# Patient Record
Sex: FEMALE | Race: WHITE | Employment: UNEMPLOYED | URBAN - METROPOLITAN AREA
[De-identification: names, ages, dates, MRNs, and addresses within clinical notes are randomized per-mention and may not be internally consistent; named-entity substitution may affect disease eponyms.]

---

## 2019-05-14 ENCOUNTER — TELEPHONE (OUTPATIENT)
Dept: SURGERY | Age: 34
End: 2019-05-14

## 2019-05-14 NOTE — TELEPHONE ENCOUNTER
Received a call from Dr. James Hdez office Faizan Harness wanting to refer the patient for RUQ pain and adenomas gallbladder. Spoke with the patient on the phone. Scheduled the patient on 5/17/19 at 1:00pm with Dr. Cordelia Haq. Bring ID, medication list, insurance card and co-pay. Gave the direction to the clinic.gave our office phone number. The patient verbalized understanding.   Electronically signed by Roro Sin on 5/14/19 at 11:16 AM

## 2019-05-17 ENCOUNTER — OFFICE VISIT (OUTPATIENT)
Dept: SURGERY | Age: 34
End: 2019-05-17
Payer: MEDICAID

## 2019-05-17 VITALS
SYSTOLIC BLOOD PRESSURE: 98 MMHG | RESPIRATION RATE: 18 BRPM | HEART RATE: 80 BPM | TEMPERATURE: 98.6 F | WEIGHT: 215 LBS | BODY MASS INDEX: 38.09 KG/M2 | DIASTOLIC BLOOD PRESSURE: 70 MMHG | HEIGHT: 63 IN

## 2019-05-17 DIAGNOSIS — K21.9 GASTROESOPHAGEAL REFLUX DISEASE WITHOUT ESOPHAGITIS: ICD-10-CM

## 2019-05-17 DIAGNOSIS — R10.13 EPIGASTRIC PAIN: Primary | ICD-10-CM

## 2019-05-17 PROCEDURE — 99203 OFFICE O/P NEW LOW 30 MIN: CPT | Performed by: SURGERY

## 2019-05-17 RX ORDER — RANITIDINE 150 MG/1
150 TABLET ORAL DAILY
Status: ON HOLD | COMMUNITY
Start: 2019-05-14 | End: 2019-05-21 | Stop reason: HOSPADM

## 2019-05-17 RX ORDER — DICYCLOMINE HYDROCHLORIDE 10 MG/1
10 CAPSULE ORAL PRN
Status: ON HOLD | COMMUNITY
Start: 2019-05-13 | End: 2022-06-07 | Stop reason: HOSPADM

## 2019-05-17 RX ORDER — OCTISALATE, AVOBENZONE, HOMOSALATE, AND OCTOCRYLENE 29.4; 29.4; 49; 25.48 MG/ML; MG/ML; MG/ML; MG/ML
LOTION TOPICAL PRN
Status: ON HOLD | COMMUNITY
Start: 2019-05-13 | End: 2022-06-07 | Stop reason: HOSPADM

## 2019-05-17 NOTE — PROGRESS NOTES
[facility] Surgery   History and Physical    Patient's Name/Date of Birth: Yanira Collins / 1985 (41 y.o.)    Date: May 17, 2019     PCP: Tamiko Goel DO    Chief Complaint:   Chief Complaint   Patient presents with    Abdominal Pain     RUQ pain    Diarrhea       History of Present Illness: Pt with 1 year epigastric RUQ pain with associated n/v occasionally diarrhea. Denies fevers chills. Pain is frequently burn aching pressure like with radiation to her back. Associated with PO intake. Smokes 7-8 cigarettes a day. Social drinker. No hx of similar sx. Father had CRC dx in late 45s. History reviewed. No pertinent past medical history. Past Surgical History:   Procedure Laterality Date    LEEP  2007       History reviewed. No pertinent family history.     Social History     Socioeconomic History    Marital status: Single     Spouse name: Not on file    Number of children: Not on file    Years of education: Not on file    Highest education level: Not on file   Occupational History    Not on file   Social Needs    Financial resource strain: Not on file    Food insecurity:     Worry: Not on file     Inability: Not on file    Transportation needs:     Medical: Not on file     Non-medical: Not on file   Tobacco Use    Smoking status: Current Every Day Smoker     Packs/day: 0.25    Smokeless tobacco: Never Used   Substance and Sexual Activity    Alcohol use: Yes     Comment: occ    Drug use: Not Currently    Sexual activity: Not on file   Lifestyle    Physical activity:     Days per week: Not on file     Minutes per session: Not on file    Stress: Not on file   Relationships    Social connections:     Talks on phone: Not on file     Gets together: Not on file     Attends Sikhism service: Not on file     Active member of club or organization: Not on file     Attends meetings of clubs or organizations: Not on file     Relationship status: Not on file    Intimate partner rule out PUD as the cause. - Will plan for EGD initially and potentially for cholecystectomy depending on the results and need for further work up     I recommended EGD with possible biopsy and I explained therisk, benefits, expected outcome, and alternatives to the procedure. Risks included but are not limited to bleeding, infection, and perforation. Min Haq understands and is in agreement.       Electronically Signed by Samuel Martines MD, FACS   1:21 PM

## 2019-05-17 NOTE — PROGRESS NOTES
products on the day of surgery    [x] If not already done, you can expect a call from registration    [x] You can expect a call the business day prior to procedure to notify you if your arrival time changes    [x] If you receive a survey after surgery we would greatly appreciate your comments    [] Parent/guardian of a minor must accompany their child and remain on the premises  the entire time they are under our care     [] Pediatric patients may bring favorite toy, blanket or comfort item with them    [] A caregiver or family member must remain with the patient during their stay if they are mentally handicapped, have dementia, disoriented or unable to use a call light or would be a safety concern if left unattended    [x] Please notify surgeon if you develop any illness between now and time of surgery (cold, cough, sore throat, fever, nausea, vomiting) or any signs of infections  including skin, wounds, and dental.    [x]  The [facility]patient [facility] is available to fill your prescription here on your day of surgery, ask your preop nurse for details    [x] Other instructions: Wear comfortable clothing  EDUCATIONAL MATERIALS PROVIDED:    [] PAT Preoperative Education Packet/Booklet     [] Medication List    [] Fluoroscopy Information Pamphlet    [] Transfusion bracelet applied with instructions    [] Joint replacement video reviewed    [] Shower with soap, lather and rinse well, and use CHG wipes provided the evening before surgery as instructed

## 2019-05-20 ENCOUNTER — TELEPHONE (OUTPATIENT)
Dept: SURGERY | Age: 34
End: 2019-05-20

## 2019-05-20 NOTE — TELEPHONE ENCOUNTER
Scheduled patient for EGD on 5/21/19 at 2:00pm in [facility]. Patient needs to report at the front entrance 1 hour prior to the procedure, no ASA products for 5 days. Patient verbalized understanding. Instruction letter handed on 5/17/19 at the office. Encouraged patient to call our office if any questions. Spoke with [facility] representative Fracisco Bates Per Yany, EGD does not require the prior authorization.    Electronically signed by Casimiro Villafuerte on 5/20/19 at 5:28 PM

## 2019-05-21 ENCOUNTER — ANESTHESIA EVENT (OUTPATIENT)
Dept: ENDOSCOPY | Age: 34
End: 2019-05-21
Payer: MEDICAID

## 2019-05-21 ENCOUNTER — HOSPITAL ENCOUNTER (OUTPATIENT)
Age: 34
Setting detail: OUTPATIENT SURGERY
Discharge: HOME | End: 2019-05-21
Attending: SURGERY | Admitting: SURGERY
Payer: MEDICAID

## 2019-05-21 ENCOUNTER — ANESTHESIA (OUTPATIENT)
Dept: ENDOSCOPY | Age: 34
End: 2019-05-21
Payer: MEDICAID

## 2019-05-21 VITALS
OXYGEN SATURATION: 97 % | SYSTOLIC BLOOD PRESSURE: 128 MMHG | RESPIRATION RATE: 12 BRPM | DIASTOLIC BLOOD PRESSURE: 88 MMHG

## 2019-05-21 VITALS
HEIGHT: 63 IN | WEIGHT: 214 LBS | HEART RATE: 76 BPM | BODY MASS INDEX: 37.92 KG/M2 | RESPIRATION RATE: 16 BRPM | SYSTOLIC BLOOD PRESSURE: 111 MMHG | OXYGEN SATURATION: 97 % | TEMPERATURE: 97.1 F | DIASTOLIC BLOOD PRESSURE: 60 MMHG

## 2019-05-21 DIAGNOSIS — K21.9 GASTROESOPHAGEAL REFLUX DISEASE WITHOUT ESOPHAGITIS: Primary | ICD-10-CM

## 2019-05-21 LAB — HCG(URINE) PREGNANCY TEST: NEGATIVE

## 2019-05-21 PROCEDURE — 43239 EGD BIOPSY SINGLE/MULTIPLE: CPT | Performed by: SURGERY

## 2019-05-21 PROCEDURE — 88342 IMHCHEM/IMCYTCHM 1ST ANTB: CPT

## 2019-05-21 PROCEDURE — 81025 URINE PREGNANCY TEST: CPT

## 2019-05-21 PROCEDURE — 6360000002 HC RX W HCPCS: Performed by: NURSE ANESTHETIST, CERTIFIED REGISTERED

## 2019-05-21 PROCEDURE — 2709999900 HC NON-CHARGEABLE SUPPLY: Performed by: SURGERY

## 2019-05-21 PROCEDURE — 3700000001 HC ADD 15 MINUTES (ANESTHESIA): Performed by: SURGERY

## 2019-05-21 PROCEDURE — 3609012400 HC EGD TRANSORAL BIOPSY SINGLE/MULTIPLE: Performed by: SURGERY

## 2019-05-21 PROCEDURE — 2580000003 HC RX 258: Performed by: NURSE ANESTHETIST, CERTIFIED REGISTERED

## 2019-05-21 PROCEDURE — 3700000000 HC ANESTHESIA ATTENDED CARE: Performed by: SURGERY

## 2019-05-21 PROCEDURE — 7100000011 HC PHASE II RECOVERY - ADDTL 15 MIN: Performed by: SURGERY

## 2019-05-21 PROCEDURE — 7100000010 HC PHASE II RECOVERY - FIRST 15 MIN: Performed by: SURGERY

## 2019-05-21 PROCEDURE — 88305 TISSUE EXAM BY PATHOLOGIST: CPT

## 2019-05-21 RX ORDER — SUCRALFATE ORAL 1 G/10ML
1 SUSPENSION ORAL 4 TIMES DAILY
Qty: 1200 ML | Refills: 3 | Status: ON HOLD | OUTPATIENT
Start: 2019-05-21 | End: 2022-06-07 | Stop reason: HOSPADM

## 2019-05-21 RX ORDER — SODIUM CHLORIDE 9 MG/ML
INJECTION, SOLUTION INTRAVENOUS CONTINUOUS PRN
Status: DISCONTINUED | OUTPATIENT
Start: 2019-05-21 | End: 2019-05-21 | Stop reason: SDUPTHER

## 2019-05-21 RX ORDER — PROPOFOL 10 MG/ML
INJECTION, EMULSION INTRAVENOUS PRN
Status: DISCONTINUED | OUTPATIENT
Start: 2019-05-21 | End: 2019-05-21 | Stop reason: SDUPTHER

## 2019-05-21 RX ORDER — PANTOPRAZOLE SODIUM 40 MG/1
40 TABLET, DELAYED RELEASE ORAL DAILY
Qty: 60 TABLET | Refills: 1 | Status: SHIPPED | OUTPATIENT
Start: 2019-05-21 | End: 2019-07-31 | Stop reason: ALTCHOICE

## 2019-05-21 RX ADMIN — PROPOFOL 300 MG: 10 INJECTION, EMULSION INTRAVENOUS at 13:48

## 2019-05-21 RX ADMIN — SODIUM CHLORIDE: 9 INJECTION, SOLUTION INTRAVENOUS at 13:48

## 2019-05-21 ASSESSMENT — LIFESTYLE VARIABLES: SMOKING_STATUS: 1

## 2019-05-21 ASSESSMENT — PAIN SCALES - GENERAL
PAINLEVEL_OUTOF10: 0

## 2019-05-21 ASSESSMENT — PAIN - FUNCTIONAL ASSESSMENT: PAIN_FUNCTIONAL_ASSESSMENT: 0-10

## 2019-05-21 NOTE — OP NOTE
EGD Op Note    DATE OF PROCEDURE: 5/21/2019     SURGEON: Kasey Bowman MD    PREOPERATIVE DIAGNOSIS: ABDOMINAL PAIN     POSTOPERATIVE DIAGNOSIS: Same Hiatal hernia, esophagitis, moderate active gastritis     OPERATION: Procedure(s):  EGD BIOPSY    ANESTHESIA: Local monitored anesthesia. ESTIMATED BLOOD LOSS: minimal    COMPLICATIONS: None. SPECIMENS:    ID Type Source Tests Collected by Time Destination   A : Antral bx Tissue Stomach SURGICAL PATHOLOGY Kasey Bowman MD 5/21/2019 1353        HISTORY: The patient is a 29y.o. year old female with history of above preop diagnosis. I recommended esophagogastroduodenoscopy with possible biopsy and I explained the risk, benefits, expected outcome, and alternatives to the procedure. Risks included but are not limited to bleeding, infection, respiratory distress, hypotension, and perforation of the esophagus, stomach, or duodenum. Patient understands and is in agreement. PROCEDURE: The patient was given IV conscious sedation per anesthesia. The patient was given supplemental oxygen by nasal cannula. The gastroscope was inserted orally and advanced under direct vision through the esophagus, through the stomach, through the pylorus, and into the duodenum. Findings:  Duodenum:     Descending: normal    Bulb: moderate duodenitis     Stomach:    Antrum: moderate gastritis bx taken x 2     Body: same     Fundus: same, hiatal hernia, prominent rugal folds up to level of GE junction,     Esophagus: abnormal, mild esophagitis with two linear ulcers   GE junction: 30 cm from incisors     Larynx: not examined    The scope was removed and the patient tolerated the procedure well. IMPRESSION/PLAN:   1. PPI and carafate,   2. Check for H pylori in bx,   3. Will get esophogram to evaluate HH  4.  Follow up in 2 weeks     Kasey Bowman MD  05/21/19  1:58 PM

## 2019-05-21 NOTE — ANESTHESIA PRE PROCEDURE
Department of Anesthesiology  Preprocedure Note       Name:  Carmen Pineda   Age:  29 y.o.  :  1985                                          MRN:  [id number]         Date:  2019      Surgeon: Pilar Bell):  Kieran Dove MD    Procedure: EGD ESOPHAGOGASTRODUODENOSCOPY (N/A )    Medications prior to admission:   Prior to Admission medications    Medication Sig Start Date End Date Taking? Authorizing Provider   Probiotic Product (ALIGN) 4 MG CAPS Last dose 19  Yes Historical Provider, MD   ranitidine (ZANTAC) 150 MG tablet Take 150 mg by mouth daily Instructed to take morning of surgery with a sip of water 19  Yes Historical Provider, MD   dicyclomine (BENTYL) 10 MG capsule Take 10 mg by mouth  19  Yes Historical Provider, MD       Current medications:    No current facility-administered medications for this encounter.         Allergies:  No Known Allergies    Problem List:    Patient Active Problem List   Diagnosis Code    Epigastric pain R10.13    Gastroesophageal reflux disease without esophagitis K21.9       Past Medical History:        Diagnosis Date    Diarrhea     RUQ discomfort        Past Surgical History:        Procedure Laterality Date    LEEP         Social History:    Social History     Tobacco Use    Smoking status: Current Every Day Smoker     Packs/day: 0.25    Smokeless tobacco: Never Used   Substance Use Topics    Alcohol use: Yes     Comment: occasional                                Ready to quit: Not Answered  Counseling given: Not Answered      Vital Signs (Current):   Vitals:    19 1543 19 1325   BP:  105/68   Pulse:  71   Resp:  18   Temp:  96.6 °F (35.9 °C)   TempSrc:  Temporal   SpO2:  98%   Weight: 214 lb (97.1 kg) 214 lb (97.1 kg)   Height: 5' 3\" (1.6 m) 5' 3\" (1.6 m)                                              BP Readings from Last 3 Encounters:   19 105/68   19 98/70       NPO Status: Time of last liquid consumption: 2300                        Time of last solid consumption: 2300                        Date of last liquid consumption: 05/20/19                        Date of last solid food consumption: 05/20/19    BMI:   Wt Readings from Last 3 Encounters:   05/21/19 214 lb (97.1 kg)   05/17/19 215 lb (97.5 kg)     Body mass index is 37.91 kg/m². CBC: No results found for: WBC, RBC, HGB, HCT, MCV, RDW, PLT    CMP: No results found for: NA, K, CL, CO2, BUN, CREATININE, GFRAA, AGRATIO, LABGLOM, GLUCOSE, PROT, CALCIUM, BILITOT, ALKPHOS, AST, ALT    POC Tests: No results for input(s): POCGLU, POCNA, POCK, POCCL, POCBUN, POCHEMO, POCHCT in the last 72 hours. Coags: No results found for: PROTIME, INR, APTT    HCG (If Applicable):   Lab Results   Component Value Date    PREGTESTUR NEGATIVE 05/21/2019        ABGs: No results found for: PHART, PO2ART, PUK3LGT, PQU5AHC, BEART, G0EOIQYX     Type & Screen (If Applicable):  No results found for: LABABO, [address]    Anesthesia Evaluation  Patient summary reviewed no history of anesthetic complications:   Airway: Mallampati: II  TM distance: >3 FB   Neck ROM: full  Mouth opening: > = 3 FB Dental: normal exam         Pulmonary: breath sounds clear to auscultation  (+) current smoker                           Cardiovascular:Negative CV ROS            Rhythm: regular             Beta Blocker:  Not on Beta Blocker         Neuro/Psych:   Negative Neuro/Psych ROS              GI/Hepatic/Renal:   (+) GERD:,          ROS comment: RUQ DISCOMFORT  EPIGASTRIC PAIN. Endo/Other: Negative Endo/Other ROS                    Abdominal:           Vascular: negative vascular ROS. Anesthesia Plan      MAC     ASA 2       Induction: intravenous. Anesthetic plan and risks discussed with patient. Plan discussed with CRNA.             Ken Walls DO   5/21/2019

## 2019-05-21 NOTE — H&P
[facility] Surgery   History and Physical    Patient's Name/Date of Birth: Sandra Morales / 1985 (89 y.o.)    Date: May 21, 2019     PCP: Mai Cunha DO    Chief Complaint:   No chief complaint on file. History of Present Illness: Pt with 1 year epigastric RUQ pain with associated n/v occasionally diarrhea. Denies fevers chills. Pain is frequently burn aching pressure like with radiation to her back. Associated with PO intake. Smokes 7-8 cigarettes a day. Social drinker. No hx of similar sx. Father had CRC dx in late 45s. Past Medical History:   Diagnosis Date    Diarrhea     RUQ discomfort        Past Surgical History:   Procedure Laterality Date    LEEP  2007       History reviewed. No pertinent family history.     Social History     Socioeconomic History    Marital status: Single     Spouse name: Not on file    Number of children: Not on file    Years of education: Not on file    Highest education level: Not on file   Occupational History    Not on file   Social Needs    Financial resource strain: Not on file    Food insecurity:     Worry: Not on file     Inability: Not on file    Transportation needs:     Medical: Not on file     Non-medical: Not on file   Tobacco Use    Smoking status: Current Every Day Smoker     Packs/day: 0.25    Smokeless tobacco: Never Used   Substance and Sexual Activity    Alcohol use: Yes     Comment: occasional    Drug use: Not Currently    Sexual activity: Not on file   Lifestyle    Physical activity:     Days per week: Not on file     Minutes per session: Not on file    Stress: Not on file   Relationships    Social connections:     Talks on phone: Not on file     Gets together: Not on file     Attends Jehovah's witness service: Not on file     Active member of club or organization: Not on file     Attends meetings of clubs or organizations: Not on file     Relationship status: Not on file    Intimate partner violence:     Fear of current or ex partner: Not on file     Emotionally abused: Not on file     Physically abused: Not on file     Forced sexual activity: Not on file   Other Topics Concern    Not on file   Social History Narrative    Not on file       No current facility-administered medications for this encounter. No Known Allergies    REVIEW OF SYSTEMS:    Constitutional: negative   Eyes: negative  Ears, nose, mouth, throat, and face: negative  Respiratory: negative  Cardiovascular: negative  Gastrointestinal: abd pain, upper, occasional bleeding per rectum small amounts bright red. Genitourinary:negative  Integument/breast: negative  Hematologic/lymphatic: negative  Musculoskeletal:negative  Neurological: negative  Allergic/Immunologic: negative      Physical Exam:    @/68   Pulse 71   Temp 96.6 °F (35.9 °C) (Temporal)   Resp 18   Ht 5' 3\" (1.6 m)   Wt 214 lb (97.1 kg)   LMP 05/05/2019 Comment: urine preg sent  SpO2 98%   BMI 37.91 kg/m² @    GENERAL EXAM: On exam- pt appears stated age. No acute distress. NEURO:  Alert and oriented x 3. No obvious neuro deficits   HEENT: head- atraumatic-normocephalic. No discharge from ears, nose or throat. NECK: Supple. No jugular venous distention. CVS:Heart rate and rhythm regular. LUNGS: Bilateral chest movements without the use of accessory muscles. Respirations easy, nonlabored, breath sounds clear   ABDOMEN: Abdomen soft, nondistended, nontender, No palpable hernias noted. Mild to moderate RUQ tenderness   RECTAL: exam deferred. EXTREMITIES: No edema, NVI and symmetrical        US from OSH shows adenomyosis of the gallbladder. The record did not comment on size of polyps within the gallbladder. IMPRESSION/PLAN: RUQ pain with biliary type sx and gallbladder polyps sizes unknown. Cannot rule out PUD as the cause.         - Will plan for EGD initially and potentially for cholecystectomy depending on the results and need for further work up     I recommended EGD with possible biopsy and I explained therisk, benefits, expected outcome, and alternatives to the procedure. Risks included but are not limited to bleeding, infection, and perforation. Jacinda Hernandes understands and is in agreement. No changes, since seen in office.      Electronically Signed by Gabe Alexandre MD FACS   1:39 PM

## 2019-05-21 NOTE — ANESTHESIA POSTPROCEDURE EVALUATION
Department of Anesthesiology  Postprocedure Note    Patient: Yanira Collins  MRN: [id number]  YOB: 1985  Date of evaluation: 5/21/2019  Time:  2:26 PM     Procedure Summary     Date:  05/21/19 Room / Location:  [facility] 03 / [facility] ENDOSCOPY    Anesthesia Start:  0993 Anesthesia Stop:  1400    Procedure:  EGD BIOPSY (N/A ) Diagnosis:  (GERD)    Surgeon:  Pramod Rodriguez MD Responsible Provider:  Carolann Davies DO    Anesthesia Type:  MAC ASA Status:  2          Anesthesia Type: MAC    Constance Phase I: Constance Score: 10    Constance Phase II: Constance Score: 10    Last vitals: Reviewed and per EMR flowsheets.        Anesthesia Post Evaluation    Patient location during evaluation: PACU  Patient participation: complete - patient participated  Level of consciousness: awake and alert  Airway patency: patent  Nausea & Vomiting: no nausea and no vomiting  Complications: no  Cardiovascular status: hemodynamically stable  Respiratory status: acceptable  Hydration status: euvolemic

## 2019-05-30 DIAGNOSIS — K21.9 GASTROESOPHAGEAL REFLUX DISEASE WITHOUT ESOPHAGITIS: Primary | ICD-10-CM

## 2019-06-12 ENCOUNTER — TELEPHONE (OUTPATIENT)
Dept: SURGERY | Age: 34
End: 2019-06-12

## 2019-06-12 ENCOUNTER — INITIAL CONSULT (OUTPATIENT)
Dept: SURGERY | Age: 34
End: 2019-06-12
Payer: MEDICAID

## 2019-06-12 VITALS
HEART RATE: 80 BPM | DIASTOLIC BLOOD PRESSURE: 84 MMHG | HEIGHT: 63 IN | WEIGHT: 200 LBS | BODY MASS INDEX: 35.44 KG/M2 | SYSTOLIC BLOOD PRESSURE: 136 MMHG

## 2019-06-12 DIAGNOSIS — T81.32XD DISRUPTION OR DEHISCENCE OF CLOSURE OF MUSCLE OR MUSCLE FLAP, SUBSEQUENT ENCOUNTER: ICD-10-CM

## 2019-06-12 DIAGNOSIS — K44.9 PARAESOPHAGEAL HERNIA: ICD-10-CM

## 2019-06-12 DIAGNOSIS — K21.9 GASTROESOPHAGEAL REFLUX DISEASE WITHOUT ESOPHAGITIS: Primary | ICD-10-CM

## 2019-06-12 DIAGNOSIS — R10.13 EPIGASTRIC PAIN: ICD-10-CM

## 2019-06-12 PROCEDURE — 4004F PT TOBACCO SCREEN RCVD TLK: CPT | Performed by: SURGERY

## 2019-06-12 PROCEDURE — G8417 CALC BMI ABV UP PARAM F/U: HCPCS | Performed by: SURGERY

## 2019-06-12 PROCEDURE — G8427 DOCREV CUR MEDS BY ELIG CLIN: HCPCS | Performed by: SURGERY

## 2019-06-12 PROCEDURE — 99215 OFFICE O/P EST HI 40 MIN: CPT | Performed by: SURGERY

## 2019-06-12 NOTE — TELEPHONE ENCOUNTER
Per Dr. Benjamin Vaz, patient is scheduled for EGD with pH/Manometry on 6/17/19 at [facility]. Patient is also scheduled for laparoscopic paraesophageal hernia repair on 7/16/19. Surgery scheduling forms faxed with confirmation. Manometry order/scheduling request sent to endoscopy/Shira with confirmation. Surgeons calendar updated. Dr. Benjamin Vaz to enter orders. Follow up appointment scheduled for 7/31/19 at 1000. Per [facility] community rep, no prior authorization is required for scheduled outpatient procedures cpt's [phone] & [id number]. Call ref# [id number].     Electronically signed by Homer Wharton RN on 6/12/2019 at 2:06 PM

## 2019-06-12 NOTE — PROGRESS NOTES
on file     Non-medical: Not on file   Tobacco Use    Smoking status: Current Every Day Smoker     Packs/day: 0.25    Smokeless tobacco: Never Used   Substance and Sexual Activity    Alcohol use: Yes     Comment: occasional    Drug use: Not Currently    Sexual activity: Not on file   Lifestyle    Physical activity:     Days per week: Not on file     Minutes per session: Not on file    Stress: Not on file   Relationships    Social connections:     Talks on phone: Not on file     Gets together: Not on file     Attends Shinto service: Not on file     Active member of club or organization: Not on file     Attends meetings of clubs or organizations: Not on file     Relationship status: Not on file    Intimate partner violence:     Fear of current or ex partner: Not on file     Emotionally abused: Not on file     Physically abused: Not on file     Forced sexual activity: Not on file   Other Topics Concern    Not on file   Social History Narrative    Not on file           Review of Systems  Review of Systems -  General ROS: negative for - chills, fatigue or malaise  ENT ROS: negative for - hearing change, nasal congestion or nasal discharge  Allergy and Immunology ROS: negative for - hives, itchy/watery eyes or nasal congestion  Hematological and Lymphatic ROS: negative for - blood clots, blood transfusions, bruising or fatigue  Endocrine ROS: negative for - malaise/lethargy, mood swings, palpitations or polydipsia/polyuria  Respiratory ROS: negative for - sputum changes, stridor, tachypnea or wheezing  Cardiovascular ROS: negative for - irregular heartbeat, loss of consciousness, murmur or orthopnea  Gastrointestinal ROS: negative for - constipation, diarrhea, gas/bloating, heartburn or hematemesis  Genito-Urinary ROS: negative for -  genital discharge, genital ulcers or hematuria  Musculoskeletal ROS: negative for - gait disturbance, muscle pain or muscular weakness  Psych/Soc ROS: Neg suicidal ideation or visual/auditory hallucinations    Physical exam:   /84 (Site: Left Upper Arm, Position: Sitting, Cuff Size: Large Adult)   Pulse 80   Ht 5' 3\" (1.6 m)   Wt 200 lb (90.7 kg)   LMP 05/05/2019 Comment: urine preg sent  BMI 35.43 kg/m²   General appearance:  NAD  Head: NCAT, PERRLA, EOMI, red conjunctiva  Neck: supple, no masses  Lungs: CTAB, equal chest rise bilateral  Heart: Reg rate  Abdomen: soft, nontender, nondistended  Rectal: No bleeding  Skin; no lesions  Gu: no cva tenderness  Extremities: no edema  Psychosocial: No auditory or visual hallucinations      Radiology:    Manometry: Pending  PH testing: Pending  Last EGD pathology was neg for h pylori    Assessment:  Jeff Bhatt is a 29 y.o. female with symptomatic paraesophageal hernia  Patient Active Problem List   Diagnosis    Epigastric pain    Gastroesophageal reflux disease without esophagitis         Plan:  EGD with pH and manometry  Pending findings would proceed to OR with lap poss robot assisted paraesophageal hernia repair with fundoplication, myofascial flap and poss gastropexy  Discussed the risk, benefits and alternatives of surgery including wound infections, bleeding, scar, recurrent hernia formation, dysphagia, inability to belch or vomit, stricture and need for dilation and the risks of general anesthetic including MI, CVA, sudden death or reactions to anesthetic medications. The patient understands the risks and alternatives and the possibility of converting to an open procedure. All questions were answered to the patient's satisfaction and they freely signed the consent.              Viktor Smart MD  10:29 AM  6/12/2019

## 2019-06-14 RX ORDER — PROMETHAZINE HYDROCHLORIDE 12.5 MG/1
12.5 TABLET ORAL EVERY 6 HOURS PRN
COMMUNITY
End: 2019-07-31

## 2019-06-14 RX ORDER — AMITRIPTYLINE HYDROCHLORIDE 10 MG/1
10 TABLET, FILM COATED ORAL NIGHTLY
Status: ON HOLD | COMMUNITY
End: 2022-06-07 | Stop reason: HOSPADM

## 2019-06-16 ENCOUNTER — ANESTHESIA EVENT (OUTPATIENT)
Dept: ENDOSCOPY | Age: 34
End: 2019-06-16

## 2019-06-17 ENCOUNTER — ANESTHESIA (OUTPATIENT)
Dept: ENDOSCOPY | Age: 34
End: 2019-06-17

## 2019-06-17 ENCOUNTER — HOSPITAL ENCOUNTER (OUTPATIENT)
Age: 34
Setting detail: OUTPATIENT SURGERY
Discharge: HOME | End: 2019-06-17
Attending: INTERNAL MEDICINE | Admitting: INTERNAL MEDICINE
Payer: MEDICAID

## 2019-06-17 VITALS
RESPIRATION RATE: 14 BRPM | BODY MASS INDEX: 37.03 KG/M2 | WEIGHT: 209 LBS | DIASTOLIC BLOOD PRESSURE: 81 MMHG | OXYGEN SATURATION: 96 % | TEMPERATURE: 97.2 F | HEIGHT: 63 IN | SYSTOLIC BLOOD PRESSURE: 141 MMHG | HEART RATE: 69 BPM

## 2019-06-17 LAB
AMPHETAMINE SCREEN, URINE: NOT DETECTED
BARBITURATE SCREEN URINE: NOT DETECTED
BENZODIAZEPINE SCREEN, URINE: NOT DETECTED
CANNABINOID SCREEN URINE: POSITIVE
COCAINE METABOLITE SCREEN URINE: POSITIVE
HCG(URINE) PREGNANCY TEST: NEGATIVE
METHADONE SCREEN, URINE: NOT DETECTED
OPIATE SCREEN URINE: NOT DETECTED
PHENCYCLIDINE SCREEN URINE: NOT DETECTED
PROPOXYPHENE SCREEN: NOT DETECTED

## 2019-06-17 PROCEDURE — 6370000000 HC RX 637 (ALT 250 FOR IP): Performed by: INTERNAL MEDICINE

## 2019-06-17 PROCEDURE — 80307 DRUG TEST PRSMV CHEM ANLYZR: CPT

## 2019-06-17 PROCEDURE — 3609015500 HC GASTRIC/DUODENAL MOTILITY &/OR MANOMETRY STUDY: Performed by: INTERNAL MEDICINE

## 2019-06-17 PROCEDURE — 81025 URINE PREGNANCY TEST: CPT

## 2019-06-17 PROCEDURE — 2580000003 HC RX 258: Performed by: ANESTHESIOLOGY

## 2019-06-17 RX ORDER — SODIUM CHLORIDE 0.9 % (FLUSH) 0.9 %
10 SYRINGE (ML) INJECTION PRN
Status: DISCONTINUED | OUTPATIENT
Start: 2019-06-17 | End: 2019-06-19 | Stop reason: HOSPADM

## 2019-06-17 RX ORDER — SODIUM CHLORIDE 0.9 % (FLUSH) 0.9 %
10 SYRINGE (ML) INJECTION EVERY 12 HOURS SCHEDULED
Status: DISCONTINUED | OUTPATIENT
Start: 2019-06-17 | End: 2019-06-19 | Stop reason: HOSPADM

## 2019-06-17 RX ORDER — SODIUM CHLORIDE, SODIUM LACTATE, POTASSIUM CHLORIDE, CALCIUM CHLORIDE 600; 310; 30; 20 MG/100ML; MG/100ML; MG/100ML; MG/100ML
INJECTION, SOLUTION INTRAVENOUS CONTINUOUS
Status: DISCONTINUED | OUTPATIENT
Start: 2019-06-17 | End: 2019-06-19 | Stop reason: HOSPADM

## 2019-06-17 RX ADMIN — SODIUM CHLORIDE, POTASSIUM CHLORIDE, SODIUM LACTATE AND CALCIUM CHLORIDE: 600; 310; 30; 20 INJECTION, SOLUTION INTRAVENOUS at 12:11

## 2019-06-17 ASSESSMENT — LIFESTYLE VARIABLES: SMOKING_STATUS: 1

## 2019-06-17 NOTE — ANESTHESIA PRE PROCEDURE
Department of Anesthesiology  Preprocedure Note       Name:  Rachel Lyn   Age:  29 y.o.  :  1985                                          MRN:  [id number]         Date:  2019      Surgeon:  Dr. Izaiah Slaughter MD.    Procedure: ESOPHAGEAL MOTILITY/MANOMETRY STUDY (N/A )  EGD ESOPHAGOGASTRODUODENOSCOPY WITH BRAVO (N/A )    Medications prior to admission:   Prior to Admission medications    Medication Sig Start Date End Date Taking? Authorizing Provider   amitriptyline (ELAVIL) 10 MG tablet Take 10 mg by mouth nightly    Historical Provider, MD   promethazine (PHENERGAN) 12.5 MG tablet Take 12.5 mg by mouth every 6 hours as needed for Nausea    Historical Provider, MD   pantoprazole (PROTONIX) 40 MG tablet Take 1 tablet by mouth daily 19   Seven Barreto MD   sucralfate (CARAFATE) 1 GM/10ML suspension Take 10 mLs by mouth 4 times daily 19   Seven Barreto MD   Probiotic Product (ALIGN) 4 MG CAPS Last dose 19   Historical Provider, MD   dicyclomine (BENTYL) 10 MG capsule Take 10 mg by mouth  19   Historical Provider, MD       Current medications:    Current Outpatient Medications   Medication Sig Dispense Refill    amitriptyline (ELAVIL) 10 MG tablet Take 10 mg by mouth nightly      promethazine (PHENERGAN) 12.5 MG tablet Take 12.5 mg by mouth every 6 hours as needed for Nausea      pantoprazole (PROTONIX) 40 MG tablet Take 1 tablet by mouth daily 60 tablet 1    sucralfate (CARAFATE) 1 GM/10ML suspension Take 10 mLs by mouth 4 times daily 1200 mL 3    Probiotic Product (ALIGN) 4 MG CAPS Last dose 19      dicyclomine (BENTYL) 10 MG capsule Take 10 mg by mouth        No current facility-administered medications for this visit.         Allergies:  No Known Allergies    Problem List:    Patient Active Problem List   Diagnosis Code    Epigastric pain R10.13    Gastroesophageal reflux disease without esophagitis K21.9       Past Medical History:        Diagnosis Date  Diarrhea     RUQ discomfort        Past Surgical History:        Procedure Laterality Date    LEEP  2007    UPPER GASTROINTESTINAL ENDOSCOPY N/A 5/21/2019    EGD BIOPSY performed by Steven Mckeon MD at [facility] ENDOSCOPY       Social History:    Social History     Tobacco Use    Smoking status: Current Every Day Smoker     Packs/day: 0.25    Smokeless tobacco: Never Used   Substance Use Topics    Alcohol use: Yes     Comment: occasional                                Ready to quit: Not Answered  Counseling given: Not Answered      Vital Signs (Current): There were no vitals filed for this visit. BP Readings from Last 3 Encounters:   06/12/19 136/84   05/21/19 111/60   05/21/19 128/88       NPO Status:                                                                                 BMI:   Wt Readings from Last 3 Encounters:   06/12/19 200 lb (90.7 kg)   05/21/19 214 lb (97.1 kg)   05/17/19 215 lb (97.5 kg)     There is no height or weight on file to calculate BMI.    CBC: No results found for: WBC, RBC, HGB, HCT, MCV, RDW, PLT    CMP: No results found for: NA, K, CL, CO2, BUN, CREATININE, GFRAA, AGRATIO, LABGLOM, GLUCOSE, PROT, CALCIUM, BILITOT, ALKPHOS, AST, ALT    POC Tests: No results for input(s): POCGLU, POCNA, POCK, POCCL, POCBUN, POCHEMO, POCHCT in the last 72 hours.     Coags: No results found for: PROTIME, INR, APTT    HCG (If Applicable):   Lab Results   Component Value Date    PREGTESTUR NEGATIVE 05/21/2019        ABGs: No results found for: PHART, PO2ART, OIV7VSI, QLD2HQR, BEART, E1FAEYEJ     Type & Screen (If Applicable):  No results found for: LABABO, [address]    Anesthesia Evaluation  Patient summary reviewed no history of anesthetic complications:   Airway: Mallampati: II  TM distance: >3 FB   Neck ROM: full  Mouth opening: > = 3 FB Dental: normal exam         Pulmonary: breath sounds clear to auscultation  (+) current smoker (.25 PPD.)                           Cardiovascular:Negative CV ROS            Rhythm: regular             Beta Blocker:  Not on Beta Blocker         Neuro/Psych:   Negative Neuro/Psych ROS              GI/Hepatic/Renal:   (+) GERD:,          ROS comment: RUQ DISCOMFORT  EPIGASTRIC PAIN  H/O Diarrhea. .   Endo/Other: Negative Endo/Other ROS                    Abdominal:           Vascular: negative vascular ROS. Anesthesia Plan      MAC     ASA 2       Induction: intravenous. Anesthetic plan and risks discussed with patient. Plan discussed with CRNA.             Elyse Velasquez MD   6/17/2019

## 2019-07-10 NOTE — TELEPHONE ENCOUNTER
PEr Dr Nimco Islas EGD cancelled due to fail drug test, Surgery cancelled due to not having EGD. Will follow up with Dr Nimco Islas and RN to see further information on next steps.  Electronically signed by Ciro Martinez MA on 7/10/19 at 11:49 AM

## 2019-07-15 ENCOUNTER — TELEPHONE (OUTPATIENT)
Dept: SURGERY | Age: 34
End: 2019-07-15

## 2019-07-15 NOTE — TELEPHONE ENCOUNTER
Patient rescheduled to 8/1/19. Form faxed awaiting confirmation calendar updated and follow up rescheduled.  Electronically signed by Phoenix Jean MA on 7/15/19 at 11:15 AM

## 2019-08-01 ENCOUNTER — ANESTHESIA (OUTPATIENT)
Dept: OPERATING ROOM | Age: 34
End: 2019-08-01
Payer: MEDICAID

## 2019-08-01 ENCOUNTER — ANESTHESIA EVENT (OUTPATIENT)
Dept: OPERATING ROOM | Age: 34
End: 2019-08-01
Payer: MEDICAID

## 2019-08-01 ENCOUNTER — HOSPITAL ENCOUNTER (OUTPATIENT)
Age: 34
Setting detail: OUTPATIENT SURGERY
Discharge: HOME | End: 2019-08-01
Attending: SURGERY | Admitting: SURGERY
Payer: MEDICAID

## 2019-08-01 VITALS
SYSTOLIC BLOOD PRESSURE: 128 MMHG | HEART RATE: 60 BPM | OXYGEN SATURATION: 93 % | TEMPERATURE: 97.6 F | WEIGHT: 206 LBS | HEIGHT: 63 IN | DIASTOLIC BLOOD PRESSURE: 65 MMHG | BODY MASS INDEX: 36.5 KG/M2 | RESPIRATION RATE: 16 BRPM

## 2019-08-01 VITALS
OXYGEN SATURATION: 95 % | DIASTOLIC BLOOD PRESSURE: 109 MMHG | TEMPERATURE: 98.6 F | SYSTOLIC BLOOD PRESSURE: 149 MMHG | RESPIRATION RATE: 13 BRPM

## 2019-08-01 DIAGNOSIS — G89.18 POSTOPERATIVE PAIN: Primary | ICD-10-CM

## 2019-08-01 LAB
AMPHETAMINE SCREEN, URINE: NOT DETECTED
BARBITURATE SCREEN URINE: NOT DETECTED
BENZODIAZEPINE SCREEN, URINE: NOT DETECTED
CANNABINOID SCREEN URINE: POSITIVE
COCAINE METABOLITE SCREEN URINE: NOT DETECTED
HCG(URINE) PREGNANCY TEST: NEGATIVE
METHADONE SCREEN, URINE: NOT DETECTED
OPIATE SCREEN URINE: NOT DETECTED
PHENCYCLIDINE SCREEN URINE: NOT DETECTED
PROPOXYPHENE SCREEN: NOT DETECTED

## 2019-08-01 PROCEDURE — 80307 DRUG TEST PRSMV CHEM ANLYZR: CPT

## 2019-08-01 PROCEDURE — 2580000003 HC RX 258: Performed by: ANESTHESIOLOGY

## 2019-08-01 PROCEDURE — G0480 DRUG TEST DEF 1-7 CLASSES: HCPCS

## 2019-08-01 PROCEDURE — S2900 ROBOTIC SURGICAL SYSTEM: HCPCS | Performed by: SURGERY

## 2019-08-01 PROCEDURE — 6360000002 HC RX W HCPCS: Performed by: NURSE ANESTHETIST, CERTIFIED REGISTERED

## 2019-08-01 PROCEDURE — 3600000009 HC SURGERY ROBOT BASE: Performed by: SURGERY

## 2019-08-01 PROCEDURE — 2709999900 HC NON-CHARGEABLE SUPPLY: Performed by: SURGERY

## 2019-08-01 PROCEDURE — 6360000002 HC RX W HCPCS: Performed by: SURGERY

## 2019-08-01 PROCEDURE — 2500000003 HC RX 250 WO HCPCS: Performed by: NURSE ANESTHETIST, CERTIFIED REGISTERED

## 2019-08-01 PROCEDURE — 7100000000 HC PACU RECOVERY - FIRST 15 MIN: Performed by: SURGERY

## 2019-08-01 PROCEDURE — 2500000003 HC RX 250 WO HCPCS: Performed by: SURGERY

## 2019-08-01 PROCEDURE — 81025 URINE PREGNANCY TEST: CPT

## 2019-08-01 PROCEDURE — 6360000002 HC RX W HCPCS: Performed by: ANESTHESIOLOGY

## 2019-08-01 PROCEDURE — 7100000010 HC PHASE II RECOVERY - FIRST 15 MIN: Performed by: SURGERY

## 2019-08-01 PROCEDURE — 43281 LAP PARAESOPHAG HERN REPAIR: CPT | Performed by: SURGERY

## 2019-08-01 PROCEDURE — 7100000011 HC PHASE II RECOVERY - ADDTL 15 MIN: Performed by: SURGERY

## 2019-08-01 PROCEDURE — 3700000000 HC ANESTHESIA ATTENDED CARE: Performed by: SURGERY

## 2019-08-01 PROCEDURE — 15734 MUSCLE-SKIN GRAFT TRUNK: CPT | Performed by: SURGERY

## 2019-08-01 PROCEDURE — 2720000010 HC SURG SUPPLY STERILE: Performed by: SURGERY

## 2019-08-01 PROCEDURE — 6370000000 HC RX 637 (ALT 250 FOR IP): Performed by: ANESTHESIOLOGY

## 2019-08-01 PROCEDURE — 3600000019 HC SURGERY ROBOT ADDTL 15MIN: Performed by: SURGERY

## 2019-08-01 PROCEDURE — 7100000001 HC PACU RECOVERY - ADDTL 15 MIN: Performed by: SURGERY

## 2019-08-01 PROCEDURE — 3700000001 HC ADD 15 MINUTES (ANESTHESIA): Performed by: SURGERY

## 2019-08-01 RX ORDER — HYDROCODONE BITARTRATE AND ACETAMINOPHEN 5; 325 MG/1; MG/1
1 TABLET ORAL EVERY 4 HOURS PRN
Qty: 18 TABLET | Refills: 0 | Status: SHIPPED | OUTPATIENT
Start: 2019-08-01 | End: 2019-08-04

## 2019-08-01 RX ORDER — HYDROCODONE BITARTRATE AND ACETAMINOPHEN 5; 325 MG/1; MG/1
1 TABLET ORAL ONCE
Status: COMPLETED | OUTPATIENT
Start: 2019-08-01 | End: 2019-08-01

## 2019-08-01 RX ORDER — DEXAMETHASONE SODIUM PHOSPHATE 10 MG/ML
INJECTION, SOLUTION INTRAMUSCULAR; INTRAVENOUS PRN
Status: DISCONTINUED | OUTPATIENT
Start: 2019-08-01 | End: 2019-08-01 | Stop reason: SDUPTHER

## 2019-08-01 RX ORDER — SODIUM CHLORIDE 0.9 % (FLUSH) 0.9 %
10 SYRINGE (ML) INJECTION EVERY 12 HOURS SCHEDULED
Status: DISCONTINUED | OUTPATIENT
Start: 2019-08-01 | End: 2019-08-01 | Stop reason: HOSPADM

## 2019-08-01 RX ORDER — MIDAZOLAM HYDROCHLORIDE 1 MG/ML
INJECTION INTRAMUSCULAR; INTRAVENOUS PRN
Status: DISCONTINUED | OUTPATIENT
Start: 2019-08-01 | End: 2019-08-01 | Stop reason: SDUPTHER

## 2019-08-01 RX ORDER — METHOCARBAMOL 500 MG/1
500 TABLET, FILM COATED ORAL 2 TIMES DAILY
Qty: 10 TABLET | Refills: 0 | Status: SHIPPED | OUTPATIENT
Start: 2019-08-01 | End: 2019-08-06

## 2019-08-01 RX ORDER — BUPIVACAINE HYDROCHLORIDE AND EPINEPHRINE 2.5; 5 MG/ML; UG/ML
INJECTION, SOLUTION EPIDURAL; INFILTRATION; INTRACAUDAL; PERINEURAL PRN
Status: DISCONTINUED | OUTPATIENT
Start: 2019-08-01 | End: 2019-08-01 | Stop reason: ALTCHOICE

## 2019-08-01 RX ORDER — DOCUSATE SODIUM 100 MG/1
100 CAPSULE, LIQUID FILLED ORAL 2 TIMES DAILY
Qty: 30 CAPSULE | Refills: 0 | Status: SHIPPED | OUTPATIENT
Start: 2019-08-01 | End: 2019-08-16

## 2019-08-01 RX ORDER — ONDANSETRON 2 MG/ML
INJECTION INTRAMUSCULAR; INTRAVENOUS PRN
Status: DISCONTINUED | OUTPATIENT
Start: 2019-08-01 | End: 2019-08-01 | Stop reason: SDUPTHER

## 2019-08-01 RX ORDER — SODIUM CHLORIDE 0.9 % (FLUSH) 0.9 %
10 SYRINGE (ML) INJECTION PRN
Status: DISCONTINUED | OUTPATIENT
Start: 2019-08-01 | End: 2019-08-01 | Stop reason: SDUPTHER

## 2019-08-01 RX ORDER — HYDROCODONE BITARTRATE AND ACETAMINOPHEN 10; 325 MG/1; MG/1
1 TABLET ORAL ONCE
Status: DISCONTINUED | OUTPATIENT
Start: 2019-08-01 | End: 2019-08-01 | Stop reason: CLARIF

## 2019-08-01 RX ORDER — MEPERIDINE HYDROCHLORIDE 25 MG/ML
INJECTION INTRAMUSCULAR; INTRAVENOUS; SUBCUTANEOUS
Status: DISCONTINUED
Start: 2019-08-01 | End: 2019-08-01 | Stop reason: HOSPADM

## 2019-08-01 RX ORDER — MEPERIDINE HYDROCHLORIDE 25 MG/ML
12.5 INJECTION INTRAMUSCULAR; INTRAVENOUS; SUBCUTANEOUS EVERY 5 MIN PRN
Status: DISCONTINUED | OUTPATIENT
Start: 2019-08-01 | End: 2019-08-01 | Stop reason: HOSPADM

## 2019-08-01 RX ORDER — HYDROMORPHONE HYDROCHLORIDE 1 MG/ML
0.5 INJECTION, SOLUTION INTRAMUSCULAR; INTRAVENOUS; SUBCUTANEOUS EVERY 5 MIN PRN
Status: DISCONTINUED | OUTPATIENT
Start: 2019-08-01 | End: 2019-08-01 | Stop reason: HOSPADM

## 2019-08-01 RX ORDER — PROPOFOL 10 MG/ML
INJECTION, EMULSION INTRAVENOUS PRN
Status: DISCONTINUED | OUTPATIENT
Start: 2019-08-01 | End: 2019-08-01 | Stop reason: SDUPTHER

## 2019-08-01 RX ORDER — SODIUM CHLORIDE 0.9 % (FLUSH) 0.9 %
10 SYRINGE (ML) INJECTION PRN
Status: DISCONTINUED | OUTPATIENT
Start: 2019-08-01 | End: 2019-08-01 | Stop reason: HOSPADM

## 2019-08-01 RX ORDER — ROCURONIUM BROMIDE 10 MG/ML
INJECTION, SOLUTION INTRAVENOUS PRN
Status: DISCONTINUED | OUTPATIENT
Start: 2019-08-01 | End: 2019-08-01 | Stop reason: SDUPTHER

## 2019-08-01 RX ORDER — LABETALOL 20 MG/4 ML (5 MG/ML) INTRAVENOUS SYRINGE
PRN
Status: DISCONTINUED | OUTPATIENT
Start: 2019-08-01 | End: 2019-08-01 | Stop reason: SDUPTHER

## 2019-08-01 RX ORDER — SODIUM CHLORIDE 9 MG/ML
INJECTION, SOLUTION INTRAVENOUS CONTINUOUS
Status: DISCONTINUED | OUTPATIENT
Start: 2019-08-01 | End: 2019-08-01 | Stop reason: HOSPADM

## 2019-08-01 RX ORDER — KETOROLAC TROMETHAMINE 30 MG/ML
INJECTION, SOLUTION INTRAMUSCULAR; INTRAVENOUS PRN
Status: DISCONTINUED | OUTPATIENT
Start: 2019-08-01 | End: 2019-08-01 | Stop reason: SDUPTHER

## 2019-08-01 RX ORDER — SODIUM CHLORIDE, SODIUM LACTATE, POTASSIUM CHLORIDE, CALCIUM CHLORIDE 600; 310; 30; 20 MG/100ML; MG/100ML; MG/100ML; MG/100ML
INJECTION, SOLUTION INTRAVENOUS CONTINUOUS
Status: DISCONTINUED | OUTPATIENT
Start: 2019-08-01 | End: 2019-08-01 | Stop reason: HOSPADM

## 2019-08-01 RX ORDER — IBUPROFEN 800 MG/1
800 TABLET ORAL EVERY 6 HOURS PRN
Qty: 90 TABLET | Refills: 1 | Status: SHIPPED | OUTPATIENT
Start: 2019-08-01 | End: 2019-10-03 | Stop reason: ALTCHOICE

## 2019-08-01 RX ORDER — FENTANYL CITRATE 50 UG/ML
INJECTION, SOLUTION INTRAMUSCULAR; INTRAVENOUS PRN
Status: DISCONTINUED | OUTPATIENT
Start: 2019-08-01 | End: 2019-08-01 | Stop reason: SDUPTHER

## 2019-08-01 RX ORDER — LIDOCAINE HYDROCHLORIDE 20 MG/ML
INJECTION, SOLUTION INTRAVENOUS PRN
Status: DISCONTINUED | OUTPATIENT
Start: 2019-08-01 | End: 2019-08-01 | Stop reason: SDUPTHER

## 2019-08-01 RX ADMIN — Medication 30 MG: at 11:00

## 2019-08-01 RX ADMIN — LIDOCAINE HYDROCHLORIDE 100 MG: 20 INJECTION, SOLUTION INTRAVENOUS at 10:34

## 2019-08-01 RX ADMIN — HYDROMORPHONE HYDROCHLORIDE 0.5 MG: 1 INJECTION, SOLUTION INTRAMUSCULAR; INTRAVENOUS; SUBCUTANEOUS at 12:23

## 2019-08-01 RX ADMIN — MEPERIDINE HYDROCHLORIDE 12.5 MG: 25 INJECTION INTRAMUSCULAR; INTRAVENOUS; SUBCUTANEOUS at 12:33

## 2019-08-01 RX ADMIN — LABETALOL 20 MG/4 ML (5 MG/ML) INTRAVENOUS SYRINGE 5 MG: at 11:41

## 2019-08-01 RX ADMIN — HYDROCODONE BITARTRATE AND ACETAMINOPHEN 1 TABLET: 5; 325 TABLET ORAL at 14:32

## 2019-08-01 RX ADMIN — SODIUM CHLORIDE, POTASSIUM CHLORIDE, SODIUM LACTATE AND CALCIUM CHLORIDE: 600; 310; 30; 20 INJECTION, SOLUTION INTRAVENOUS at 09:01

## 2019-08-01 RX ADMIN — ONDANSETRON HYDROCHLORIDE 4 MG: 2 INJECTION, SOLUTION INTRAMUSCULAR; INTRAVENOUS at 11:55

## 2019-08-01 RX ADMIN — Medication 50 MG: at 10:34

## 2019-08-01 RX ADMIN — FENTANYL CITRATE 100 MCG: 50 INJECTION, SOLUTION INTRAMUSCULAR; INTRAVENOUS at 10:34

## 2019-08-01 RX ADMIN — FENTANYL CITRATE 100 MCG: 50 INJECTION, SOLUTION INTRAMUSCULAR; INTRAVENOUS at 11:00

## 2019-08-01 RX ADMIN — FENTANYL CITRATE 50 MCG: 50 INJECTION, SOLUTION INTRAMUSCULAR; INTRAVENOUS at 11:09

## 2019-08-01 RX ADMIN — HYDROMORPHONE HYDROCHLORIDE 0.5 MG: 1 INJECTION, SOLUTION INTRAMUSCULAR; INTRAVENOUS; SUBCUTANEOUS at 12:28

## 2019-08-01 RX ADMIN — SODIUM CHLORIDE, POTASSIUM CHLORIDE, SODIUM LACTATE AND CALCIUM CHLORIDE: 600; 310; 30; 20 INJECTION, SOLUTION INTRAVENOUS at 10:46

## 2019-08-01 RX ADMIN — DEXAMETHASONE SODIUM PHOSPHATE 10 MG: 10 INJECTION, SOLUTION INTRAMUSCULAR; INTRAVENOUS at 11:55

## 2019-08-01 RX ADMIN — MEPERIDINE HYDROCHLORIDE 12.5 MG: 25 INJECTION INTRAMUSCULAR; INTRAVENOUS; SUBCUTANEOUS at 12:38

## 2019-08-01 RX ADMIN — MIDAZOLAM 2 MG: 1 INJECTION INTRAMUSCULAR; INTRAVENOUS at 11:55

## 2019-08-01 RX ADMIN — KETOROLAC TROMETHAMINE 30 MG: 30 INJECTION, SOLUTION INTRAMUSCULAR; INTRAVENOUS at 11:55

## 2019-08-01 RX ADMIN — Medication 2 G: at 10:45

## 2019-08-01 RX ADMIN — PROPOFOL 200 MG: 10 INJECTION, EMULSION INTRAVENOUS at 10:34

## 2019-08-01 ASSESSMENT — PULMONARY FUNCTION TESTS
PIF_VALUE: 31
PIF_VALUE: 18
PIF_VALUE: 32
PIF_VALUE: 33
PIF_VALUE: 32
PIF_VALUE: 20
PIF_VALUE: 18
PIF_VALUE: 32
PIF_VALUE: 18
PIF_VALUE: 28
PIF_VALUE: 28
PIF_VALUE: 33
PIF_VALUE: 18
PIF_VALUE: 22
PIF_VALUE: 28
PIF_VALUE: 21
PIF_VALUE: 20
PIF_VALUE: 29
PIF_VALUE: 19
PIF_VALUE: 16
PIF_VALUE: 27
PIF_VALUE: 25
PIF_VALUE: 32
PIF_VALUE: 28
PIF_VALUE: 28
PIF_VALUE: 27
PIF_VALUE: 35
PIF_VALUE: 1
PIF_VALUE: 28
PIF_VALUE: 33
PIF_VALUE: 27
PIF_VALUE: 1
PIF_VALUE: 5
PIF_VALUE: 27
PIF_VALUE: 30
PIF_VALUE: 27
PIF_VALUE: 18
PIF_VALUE: 27
PIF_VALUE: 25
PIF_VALUE: 32
PIF_VALUE: 23
PIF_VALUE: 4
PIF_VALUE: 18
PIF_VALUE: 20
PIF_VALUE: 33
PIF_VALUE: 31
PIF_VALUE: 4
PIF_VALUE: 34
PIF_VALUE: 27
PIF_VALUE: 30
PIF_VALUE: 19
PIF_VALUE: 2
PIF_VALUE: 33
PIF_VALUE: 32
PIF_VALUE: 22
PIF_VALUE: 0
PIF_VALUE: 19
PIF_VALUE: 27
PIF_VALUE: 7
PIF_VALUE: 17
PIF_VALUE: 32
PIF_VALUE: 32
PIF_VALUE: 31
PIF_VALUE: 31
PIF_VALUE: 2
PIF_VALUE: 33
PIF_VALUE: 0
PIF_VALUE: 16
PIF_VALUE: 31
PIF_VALUE: 32
PIF_VALUE: 33
PIF_VALUE: 30
PIF_VALUE: 32
PIF_VALUE: 20
PIF_VALUE: 2
PIF_VALUE: 25
PIF_VALUE: 26
PIF_VALUE: 28
PIF_VALUE: 25
PIF_VALUE: 26
PIF_VALUE: 0
PIF_VALUE: 32
PIF_VALUE: 3
PIF_VALUE: 19
PIF_VALUE: 39
PIF_VALUE: 19
PIF_VALUE: 24
PIF_VALUE: 33
PIF_VALUE: 32
PIF_VALUE: 3
PIF_VALUE: 20
PIF_VALUE: 32
PIF_VALUE: 33
PIF_VALUE: 22
PIF_VALUE: 19
PIF_VALUE: 28
PIF_VALUE: 30

## 2019-08-01 ASSESSMENT — PAIN DESCRIPTION - FREQUENCY
FREQUENCY: CONTINUOUS

## 2019-08-01 ASSESSMENT — PAIN DESCRIPTION - ORIENTATION
ORIENTATION: LEFT
ORIENTATION: LEFT;UPPER
ORIENTATION: LEFT
ORIENTATION: LEFT
ORIENTATION: LEFT;UPPER

## 2019-08-01 ASSESSMENT — PAIN SCALES - GENERAL
PAINLEVEL_OUTOF10: 10
PAINLEVEL_OUTOF10: 6
PAINLEVEL_OUTOF10: 6
PAINLEVEL_OUTOF10: 10
PAINLEVEL_OUTOF10: 0
PAINLEVEL_OUTOF10: 4

## 2019-08-01 ASSESSMENT — PAIN DESCRIPTION - PROGRESSION
CLINICAL_PROGRESSION: GRADUALLY IMPROVING

## 2019-08-01 ASSESSMENT — PAIN DESCRIPTION - PAIN TYPE
TYPE: SURGICAL PAIN

## 2019-08-01 ASSESSMENT — PAIN DESCRIPTION - DESCRIPTORS
DESCRIPTORS: CONSTANT;SHARP
DESCRIPTORS: JABBING;NAGGING
DESCRIPTORS: ACHING

## 2019-08-01 ASSESSMENT — PAIN DESCRIPTION - LOCATION
LOCATION: SHOULDER

## 2019-08-01 ASSESSMENT — PAIN DESCRIPTION - ONSET
ONSET: GRADUAL
ONSET: GRADUAL
ONSET: ON-GOING
ONSET: ON-GOING
ONSET: GRADUAL

## 2019-08-01 ASSESSMENT — PAIN - FUNCTIONAL ASSESSMENT: PAIN_FUNCTIONAL_ASSESSMENT: 0-10

## 2019-08-01 NOTE — H&P
strain: Not on file    Food insecurity:     Worry: Not on file     Inability: Not on file    Transportation needs:     Medical: Not on file     Non-medical: Not on file   Tobacco Use    Smoking status: Current Every Day Smoker     Packs/day: 0.25    Smokeless tobacco: Never Used   Substance and Sexual Activity    Alcohol use: Yes     Comment: occasional    Drug use: Yes     Types: Marijuana     Comment: last 1 week ago    Sexual activity: Not on file   Lifestyle    Physical activity:     Days per week: Not on file     Minutes per session: Not on file    Stress: Not on file   Relationships    Social connections:     Talks on phone: Not on file     Gets together: Not on file     Attends Rastafari service: Not on file     Active member of club or organization: Not on file     Attends meetings of clubs or organizations: Not on file     Relationship status: Not on file    Intimate partner violence:     Fear of current or ex partner: Not on file     Emotionally abused: Not on file     Physically abused: Not on file     Forced sexual activity: Not on file   Other Topics Concern    Not on file   Social History Narrative    Not on file           Review of Systems  Review of Systems -  General ROS: negative for - chills, fatigue or malaise  ENT ROS: negative for - hearing change, nasal congestion or nasal discharge  Allergy and Immunology ROS: negative for - hives, itchy/watery eyes or nasal congestion  Hematological and Lymphatic ROS: negative for - blood clots, blood transfusions, bruising or fatigue  Endocrine ROS: negative for - malaise/lethargy, mood swings, palpitations or polydipsia/polyuria  Respiratory ROS: negative for - sputum changes, stridor, tachypnea or wheezing  Cardiovascular ROS: negative for - irregular heartbeat, loss of consciousness, murmur or orthopnea  Gastrointestinal ROS: negative for - constipation, diarrhea, gas/bloating, heartburn or hematemesis  Genito-Urinary ROS: negative for - genital discharge, genital ulcers or hematuria  Musculoskeletal ROS: negative for - gait disturbance, muscle pain or muscular weakness  Psych/Soc ROS: Neg suicidal ideation or visual/auditory hallucinations    Physical exam:   /63   Pulse 81   Temp 99 °F (37.2 °C) (Temporal)   Resp 16   Ht 5' 3\" (1.6 m)   Wt 206 lb (93.4 kg)   LMP 07/05/2019   SpO2 95%   BMI 36.49 kg/m²   General appearance:  NAD  Head: NCAT, PERRLA, EOMI, red conjunctiva  Neck: supple, no masses  Lungs: CTAB, equal chest rise bilateral  Heart: Reg rate  Abdomen: soft, nontender, nondistended  Rectal: No bleeding  Skin; no lesions  Gu: no cva tenderness  Extremities: no edema  Psychosocial: No auditory or visual hallucinations      Radiology:    Manometry: Pending  PH testing: Pending  Last EGD pathology was neg for h pylori    Assessment:  Memo Bermudez is a 29 y.o. female with symptomatic paraesophageal hernia  Patient Active Problem List   Diagnosis    Epigastric pain    Gastroesophageal reflux disease without esophagitis         Plan:  Manometry with failed swallows on 80% spastic contractions without EGJ outflow obs  Proceed to OR with lap poss robot assisted paraesophageal hernia repair poss fundoplication, myofascial flap and poss gastropexy  Discussed the risk, benefits and alternatives of surgery including wound infections, bleeding, scar, recurrent hernia formation, dysphagia, inability to belch or vomit, stricture and need for dilation and the risks of general anesthetic including MI, CVA, sudden death or reactions to anesthetic medications. The patient understands the risks and alternatives and the possibility of converting to an open procedure. All questions were answered to the patient's satisfaction and they freely signed the consent.              Renetta Lindquist MD  9:57 AM  8/1/2019

## 2019-08-01 NOTE — OP NOTE
more 8 mm trocars in the left upper quadrant under direct visualization. At this point, we retracted the liver cephalad and identified a large paraesophageal hiatal defect. Another 8 mm trocar was inserted in the right upper quadrant. The robot was docked over the left side. A [employer] liver retractor was then inserted subxiphoid. We then used traction and the Harmonic scalpel to take down the hernia sac, starting at the right jaren. We dissected around the esophagus 360 degrees. We dissected it up into the chest at least 10.5 cm, identified multiple adhesions up within the chest. We were able to reduce the hernia sac and gain 3 cm of intra-abdominal esophagus. We then took down about 4 cm of the short gastrics as they approached the most gastroesophageal junction, creating a window anterior and posterior. The esophagus laid within the abdomen without any retraction. We then used 0 Vicryl pledget sutures, placed in simple interrupted sutures to reapproximated the crural apparatus leaving an 4 mm posterior window. The esophagus laid without any tension. A Harmonic scalpel was used to take down the falciform ligament all the way down to the separation of the left lateral segment at the liver at its origin mobilizing it as a myofascial flap leaving the blood supply intact. We then placed the myofascial falciform ligament posterior over the area of our repair, secured it in place with 2-0 silk suture. Due to her manometry with significant esophageal dysmotility we did not perform a fundoplication. Photographs were taken. Upper endoscopy was then performed to confirm easy passage of the endoscope into the stomach. The hiatal repair was easily traversed, there was not kinking. Air and fluid passed from the scope without issue. The scope was then withdrawn after decompressing the stomach. The patient tolerated the procedure.  We then removed the liver retractor under direct visualization and each one of our trocars

## 2019-08-08 LAB — CANNABINOIDS CONF, URINE: 401 NG/ML

## 2019-08-19 ENCOUNTER — OFFICE VISIT (OUTPATIENT)
Dept: SURGERY | Age: 34
End: 2019-08-19

## 2019-08-19 VITALS
BODY MASS INDEX: 35.44 KG/M2 | HEART RATE: 75 BPM | SYSTOLIC BLOOD PRESSURE: 106 MMHG | HEIGHT: 63 IN | DIASTOLIC BLOOD PRESSURE: 75 MMHG | WEIGHT: 200 LBS

## 2019-08-19 DIAGNOSIS — T81.32XD DISRUPTION OR DEHISCENCE OF CLOSURE OF MUSCLE OR MUSCLE FLAP, SUBSEQUENT ENCOUNTER: ICD-10-CM

## 2019-08-19 DIAGNOSIS — K21.9 GASTROESOPHAGEAL REFLUX DISEASE WITHOUT ESOPHAGITIS: Primary | ICD-10-CM

## 2019-08-19 DIAGNOSIS — R10.13 EPIGASTRIC PAIN: ICD-10-CM

## 2019-08-19 DIAGNOSIS — K44.9 PARAESOPHAGEAL HERNIA: ICD-10-CM

## 2019-08-19 PROCEDURE — 99024 POSTOP FOLLOW-UP VISIT: CPT | Performed by: SURGERY

## 2019-10-03 ENCOUNTER — APPOINTMENT (OUTPATIENT)
Dept: GENERAL RADIOLOGY | Age: 34
End: 2019-10-03
Payer: MEDICAID

## 2019-10-03 ENCOUNTER — HOSPITAL ENCOUNTER (EMERGENCY)
Age: 34
Discharge: HOME | End: 2019-10-03
Payer: MEDICAID

## 2019-10-03 VITALS
SYSTOLIC BLOOD PRESSURE: 130 MMHG | DIASTOLIC BLOOD PRESSURE: 88 MMHG | RESPIRATION RATE: 16 BRPM | TEMPERATURE: 97.6 F | HEART RATE: 78 BPM | OXYGEN SATURATION: 97 %

## 2019-10-03 DIAGNOSIS — S46.912A STRAIN OF LEFT SHOULDER, INITIAL ENCOUNTER: Primary | ICD-10-CM

## 2019-10-03 PROCEDURE — 73030 X-RAY EXAM OF SHOULDER: CPT

## 2019-10-03 PROCEDURE — 99283 EMERGENCY DEPT VISIT LOW MDM: CPT

## 2019-10-03 PROCEDURE — 6370000000 HC RX 637 (ALT 250 FOR IP): Performed by: PHYSICIAN ASSISTANT

## 2019-10-03 RX ORDER — IBUPROFEN 800 MG/1
800 TABLET ORAL ONCE
Status: COMPLETED | OUTPATIENT
Start: 2019-10-03 | End: 2019-10-03

## 2019-10-03 RX ORDER — CYCLOBENZAPRINE HCL 10 MG
10 TABLET ORAL 3 TIMES DAILY PRN
Qty: 15 TABLET | Refills: 0 | Status: SHIPPED | OUTPATIENT
Start: 2019-10-03 | End: 2019-10-08

## 2019-10-03 RX ORDER — IBUPROFEN 800 MG/1
800 TABLET ORAL EVERY 6 HOURS PRN
Qty: 20 TABLET | Refills: 0 | Status: ON HOLD | OUTPATIENT
Start: 2019-10-03 | End: 2022-06-07

## 2019-10-03 RX ADMIN — IBUPROFEN 800 MG: 800 TABLET ORAL at 15:15

## 2019-10-03 ASSESSMENT — PAIN DESCRIPTION - ORIENTATION: ORIENTATION: LEFT

## 2019-10-03 ASSESSMENT — PAIN DESCRIPTION - LOCATION: LOCATION: SHOULDER

## 2019-10-03 ASSESSMENT — PAIN DESCRIPTION - PAIN TYPE: TYPE: ACUTE PAIN

## 2019-10-03 ASSESSMENT — PAIN SCALES - GENERAL
PAINLEVEL_OUTOF10: 6
PAINLEVEL_OUTOF10: 6

## 2019-11-17 ENCOUNTER — HOSPITAL ENCOUNTER (EMERGENCY)
Age: 34
Discharge: HOME | End: 2019-11-17
Attending: EMERGENCY MEDICINE
Payer: MEDICAID

## 2019-11-17 VITALS
SYSTOLIC BLOOD PRESSURE: 134 MMHG | HEIGHT: 63 IN | BODY MASS INDEX: 33.66 KG/M2 | OXYGEN SATURATION: 98 % | RESPIRATION RATE: 16 BRPM | TEMPERATURE: 98.5 F | DIASTOLIC BLOOD PRESSURE: 85 MMHG | WEIGHT: 190 LBS | HEART RATE: 97 BPM

## 2019-11-17 DIAGNOSIS — L02.91 ABSCESS: Primary | ICD-10-CM

## 2019-11-17 LAB
ANION GAP SERPL CALCULATED.3IONS-SCNC: 13 MMOL/L (ref 7–16)
BASOPHILS ABSOLUTE: 0.05 E9/L (ref 0–0.2)
BASOPHILS RELATIVE PERCENT: 0.5 % (ref 0–2)
BUN BLDV-MCNC: 8 MG/DL (ref 6–20)
CALCIUM SERPL-MCNC: 9.4 MG/DL (ref 8.6–10.2)
CHLORIDE BLD-SCNC: 100 MMOL/L (ref 98–107)
CO2: 24 MMOL/L (ref 22–29)
CREAT SERPL-MCNC: 0.6 MG/DL (ref 0.5–1)
EOSINOPHILS ABSOLUTE: 0.11 E9/L (ref 0.05–0.5)
EOSINOPHILS RELATIVE PERCENT: 1.1 % (ref 0–6)
GFR AFRICAN AMERICAN: >60
GFR NON-AFRICAN AMERICAN: >60 ML/MIN/1.73
GLUCOSE BLD-MCNC: 108 MG/DL (ref 74–99)
HCT VFR BLD CALC: 37.2 % (ref 34–48)
HEMOGLOBIN: 12.5 G/DL (ref 11.5–15.5)
IMMATURE GRANULOCYTES #: 0.03 E9/L
IMMATURE GRANULOCYTES %: 0.3 % (ref 0–5)
LYMPHOCYTES ABSOLUTE: 1.99 E9/L (ref 1.5–4)
LYMPHOCYTES RELATIVE PERCENT: 19.6 % (ref 20–42)
MCH RBC QN AUTO: 31.3 PG (ref 26–35)
MCHC RBC AUTO-ENTMCNC: 33.6 % (ref 32–34.5)
MCV RBC AUTO: 93.2 FL (ref 80–99.9)
MONOCYTES ABSOLUTE: 0.85 E9/L (ref 0.1–0.95)
MONOCYTES RELATIVE PERCENT: 8.4 % (ref 2–12)
NEUTROPHILS ABSOLUTE: 7.1 E9/L (ref 1.8–7.3)
NEUTROPHILS RELATIVE PERCENT: 70.1 % (ref 43–80)
PDW BLD-RTO: 12.1 FL (ref 11.5–15)
PLATELET # BLD: 280 E9/L (ref 130–450)
PMV BLD AUTO: 10.2 FL (ref 7–12)
POTASSIUM REFLEX MAGNESIUM: 4 MMOL/L (ref 3.5–5)
RBC # BLD: 3.99 E12/L (ref 3.5–5.5)
SODIUM BLD-SCNC: 137 MMOL/L (ref 132–146)
WBC # BLD: 10.1 E9/L (ref 4.5–11.5)

## 2019-11-17 PROCEDURE — 80048 BASIC METABOLIC PNL TOTAL CA: CPT

## 2019-11-17 PROCEDURE — 87070 CULTURE OTHR SPECIMN AEROBIC: CPT

## 2019-11-17 PROCEDURE — 10061 I&D ABSCESS COMP/MULTIPLE: CPT

## 2019-11-17 PROCEDURE — 99282 EMERGENCY DEPT VISIT SF MDM: CPT

## 2019-11-17 PROCEDURE — 6370000000 HC RX 637 (ALT 250 FOR IP): Performed by: EMERGENCY MEDICINE

## 2019-11-17 PROCEDURE — 85025 COMPLETE CBC W/AUTO DIFF WBC: CPT

## 2019-11-17 RX ORDER — LIDOCAINE HYDROCHLORIDE AND EPINEPHRINE 10; 10 MG/ML; UG/ML
20 INJECTION, SOLUTION INFILTRATION; PERINEURAL ONCE
Status: DISCONTINUED | OUTPATIENT
Start: 2019-11-17 | End: 2019-11-17 | Stop reason: HOSPADM

## 2019-11-17 RX ORDER — HYDROCODONE BITARTRATE AND ACETAMINOPHEN 5; 325 MG/1; MG/1
1 TABLET ORAL ONCE
Status: COMPLETED | OUTPATIENT
Start: 2019-11-17 | End: 2019-11-17

## 2019-11-17 RX ORDER — DOXYCYCLINE HYCLATE 100 MG
100 TABLET ORAL 2 TIMES DAILY
Qty: 20 TABLET | Refills: 0 | Status: SHIPPED | OUTPATIENT
Start: 2019-11-17 | End: 2019-11-27

## 2019-11-17 RX ORDER — CEPHALEXIN 500 MG/1
500 CAPSULE ORAL 3 TIMES DAILY
Qty: 21 CAPSULE | Refills: 0 | Status: SHIPPED | OUTPATIENT
Start: 2019-11-17 | End: 2019-11-24

## 2019-11-17 RX ADMIN — HYDROCODONE BITARTRATE AND ACETAMINOPHEN 1 TABLET: 5; 325 TABLET ORAL at 16:12

## 2019-11-17 ASSESSMENT — ENCOUNTER SYMPTOMS
ABDOMINAL PAIN: 0
SORE THROAT: 0
VOMITING: 0
CHEST TIGHTNESS: 0
SINUS PAIN: 0
NAUSEA: 0
SHORTNESS OF BREATH: 0
DIARRHEA: 0
COUGH: 0
BACK PAIN: 0

## 2019-11-17 ASSESSMENT — PAIN SCALES - GENERAL
PAINLEVEL_OUTOF10: 9
PAINLEVEL_OUTOF10: 9

## 2019-11-17 ASSESSMENT — PAIN DESCRIPTION - LOCATION: LOCATION: COCCYX

## 2019-11-17 ASSESSMENT — PAIN DESCRIPTION - PAIN TYPE: TYPE: ACUTE PAIN

## 2019-11-20 LAB
ORGANISM: ABNORMAL
WOUND/ABSCESS: ABNORMAL

## 2020-11-29 ENCOUNTER — APPOINTMENT (OUTPATIENT)
Dept: GENERAL RADIOLOGY | Age: 35
End: 2020-11-29
Payer: MEDICAID

## 2020-11-29 ENCOUNTER — HOSPITAL ENCOUNTER (EMERGENCY)
Age: 35
Discharge: HOME | End: 2020-11-29
Payer: MEDICAID

## 2020-11-29 VITALS
DIASTOLIC BLOOD PRESSURE: 81 MMHG | RESPIRATION RATE: 16 BRPM | TEMPERATURE: 97.8 F | OXYGEN SATURATION: 98 % | HEIGHT: 63 IN | HEART RATE: 76 BPM | SYSTOLIC BLOOD PRESSURE: 128 MMHG | BODY MASS INDEX: 33.66 KG/M2

## 2020-11-29 PROCEDURE — 29515 APPLICATION SHORT LEG SPLINT: CPT

## 2020-11-29 PROCEDURE — 73630 X-RAY EXAM OF FOOT: CPT

## 2020-11-29 PROCEDURE — 73610 X-RAY EXAM OF ANKLE: CPT

## 2020-11-29 PROCEDURE — 73590 X-RAY EXAM OF LOWER LEG: CPT

## 2020-11-29 PROCEDURE — 6370000000 HC RX 637 (ALT 250 FOR IP): Performed by: PHYSICIAN ASSISTANT

## 2020-11-29 PROCEDURE — 99283 EMERGENCY DEPT VISIT LOW MDM: CPT

## 2020-11-29 RX ORDER — HYDROCODONE BITARTRATE AND ACETAMINOPHEN 5; 325 MG/1; MG/1
1 TABLET ORAL ONCE
Status: COMPLETED | OUTPATIENT
Start: 2020-11-29 | End: 2020-11-29

## 2020-11-29 RX ORDER — HYDROCODONE BITARTRATE AND ACETAMINOPHEN 5; 325 MG/1; MG/1
1 TABLET ORAL EVERY 6 HOURS PRN
Qty: 12 TABLET | Refills: 0 | Status: SHIPPED | OUTPATIENT
Start: 2020-11-29 | End: 2020-12-02

## 2020-11-29 RX ADMIN — HYDROCODONE BITARTRATE AND ACETAMINOPHEN 1 TABLET: 5; 325 TABLET ORAL at 14:04

## 2020-11-29 ASSESSMENT — PAIN DESCRIPTION - LOCATION: LOCATION: ANKLE

## 2020-11-29 ASSESSMENT — PAIN SCALES - GENERAL
PAINLEVEL_OUTOF10: 10
PAINLEVEL_OUTOF10: 10

## 2020-11-29 ASSESSMENT — PAIN DESCRIPTION - ORIENTATION: ORIENTATION: LEFT

## 2020-11-29 NOTE — ED PROVIDER NOTES
[address]  Department of Emergency Medicine   ED  Encounter Note  Admit Date/RoomTime: 2020  1:55 PM  ED Room: [address] [place] [address]    NAME: Connie Elizalde  : 1985  MRN: [id number]    Chief Complaint: Ankle Pain (L ankle pain after trip/fall last night)       History of Present Illness         Connie Elizalde is a 28 y.o. old female presenting to the emergency department by private vehicle, for left ankle pain. Patient states that she stepped wrong off of a porch yesterday, rolling her left foot, ankle. She denies falling down, there was no head injury. Patient denies being on any blood thinners. She reports pain to the left foot, ankle and lower leg. She denies any previous injury or surgery to this area. She has not taken anything for pain today. ROS   Pertinent positives and negatives are stated within HPI, all other systems reviewed and are negative. Past Medical History:  has a past medical history of Diarrhea and RUQ discomfort. Surgical History:  has a past surgical history that includes LEEP (); Upper gastrointestinal endoscopy (N/A, 2019); esophageal motility study (N/A, 2019); and hiatal hernia repair (N/A, 2019). Social History:  reports that she has been smoking. She has been smoking about 0.25 packs per day. She has never used smokeless tobacco. She reports current alcohol use. She reports current drug use. Drug: Marijuana. Family History: family history is not on file. Allergies: Patient has no known allergies. Physical Exam           ED Triage Vitals   BP Temp Temp Source Pulse Resp SpO2 Height Weight   20 1353 20 1351 20 1353 20 1351 20 1351 20 1351 20 1353 --   128/81 97.1 °F (36.2 °C) Infrared 78 16 97 % 5' 3\" (1.6 m)        Oxygen Saturation Interpretation: Normal.    Constitutional:  Alert, development consistent with age. Neck:  Normal ROM. Supple.    Ankle:  Left Lateral and Medial:              Tenderness:  moderate. Swelling: Mild. Deformity: no.             ROM: diminished range with pain. Skin:  no erythema, rash or wounds noted. Neurovascular: Motor deficit: none. Sensory deficit:   none. Pulse deficit: none. 2+ dorsalis pedis pulse of the left foot. Capillary refill: normal.  Knee:              Tenderness:  none. Swelling: None. Deformity: no.             ROM: full range of motion. Skin:  no erythema, rash or wounds noted. Patient has moderate tenderness to palpation over the mid lower leg. No bruising or swelling is present. Foot:              Tenderness: Moderate over the lateral foot. Swelling: Mild. Deformity: no.             ROM: diminished range with pain. Skin:  no erythema, rash or wounds noted. Gait:  Unable d/t pain. Lymphatics: No lymphangitis or adenopathy noted. Neurological:  Oriented. Motor functions intact. Lab / Imaging Results   (All laboratory and radiology results have been personally reviewed by myself)  Labs:  No results found for this visit on 11/29/20. Imaging: All Radiology results interpreted by Radiologist unless otherwise noted. XR TIBIA FIBULA LEFT (2 VIEWS)   Final Result   No acute osseous abnormality. XR ANKLE LEFT (MIN 3 VIEWS)   Final Result   Unremarkable left ankle   There is a minimally displaced fracture through the base of the 5th metatarsal      XR FOOT LEFT (MIN 3 VIEWS)   Final Result   Minimally displaced fracture through the base of the 5th metatarsal.        ED Course / Medical Decision Making     Medications   HYDROcodone-acetaminophen (NORCO) 5-325 MG per tablet 1 tablet (1 tablet Oral Given 11/29/20 1404)         Recheck  Time: 4088   Updated on results. Time: 1500   Patient has splint in place.  Denies paresthesias, patient has brisk cap refill of the digits of the left foot. Consults:   None    Procedure(s):   none    MDM:      Patient has base of the fifth metatarsal fracture, placed in a splint. No other acute fractures. She has a good peripheral pulse. Advised to be nonweightbearing. Advised return the ER for any worsening symptoms but otherwise to follow-up with Ortho. Counseling:   I reviewed today's visit with the patient in addition to providing specific details for the plan of care and counseling regarding the diagnosis and prognosis. Questions are answered at this time and are agreeable with the plan. .    Assessment      1. Closed displaced fracture of fifth metatarsal bone of left foot, initial encounter      Plan   Discharge to home  Patient condition is good    New Medications     Discharge Medication List as of 11/29/2020  2:49 PM      START taking these medications    Details   HYDROcodone-acetaminophen (NORCO) 5-325 MG per tablet Take 1 tablet by mouth every 6 hours as needed for Pain for up to 3 days. , Disp-12 tablet,R-0Print           Electronically signed by THI Weber   DD: 11/29/20  **This report was transcribed using voice recognition software. Every effort was made to ensure accuracy; however, inadvertent computerized transcription errors may be present.   END OF ED PROVIDER NOTE       Nany Weber  11/29/20 8097

## 2020-12-02 ENCOUNTER — OFFICE VISIT (OUTPATIENT)
Dept: ORTHOPEDIC SURGERY | Age: 35
End: 2020-12-02
Payer: MEDICAID

## 2020-12-02 VITALS — TEMPERATURE: 97.3 F | BODY MASS INDEX: 35.44 KG/M2 | WEIGHT: 200 LBS | HEIGHT: 63 IN

## 2020-12-02 PROCEDURE — 99203 OFFICE O/P NEW LOW 30 MIN: CPT | Performed by: STUDENT IN AN ORGANIZED HEALTH CARE EDUCATION/TRAINING PROGRAM

## 2020-12-02 PROCEDURE — G8417 CALC BMI ABV UP PARAM F/U: HCPCS | Performed by: STUDENT IN AN ORGANIZED HEALTH CARE EDUCATION/TRAINING PROGRAM

## 2020-12-02 PROCEDURE — G8484 FLU IMMUNIZE NO ADMIN: HCPCS | Performed by: STUDENT IN AN ORGANIZED HEALTH CARE EDUCATION/TRAINING PROGRAM

## 2020-12-02 PROCEDURE — 4004F PT TOBACCO SCREEN RCVD TLK: CPT | Performed by: STUDENT IN AN ORGANIZED HEALTH CARE EDUCATION/TRAINING PROGRAM

## 2020-12-02 PROCEDURE — G8427 DOCREV CUR MEDS BY ELIG CLIN: HCPCS | Performed by: STUDENT IN AN ORGANIZED HEALTH CARE EDUCATION/TRAINING PROGRAM

## 2020-12-02 NOTE — PROGRESS NOTES
A walking boot was applied to the left lower leg. Use and care instructions were reviewed with patient.     Brace supplied by and billed for by [facility]

## 2020-12-02 NOTE — PROGRESS NOTES
New Ankle Patient     Referring Provider: No referring provider defined for this encounter. CHIEF COMPLAINT:   Chief Complaint   Patient presents with    ED Follow-up     11/29/20. ( L ) foot 5th metatarsal fx. Splinted from ED     Foot Injury     11/28 - pt was drinking at a friends house, fell off her stoop and twisted her foot. Went to the ED 12 hours later. HPI:    Jose G Reinoso is a 28y.o. year old female who is seen today  for evaluation of left foot pain. She reports the pain has been ongoing for the past 2 days. She does recall a specific injury which started the pain. Patient states that she was drinking when she stepped onto a toy rolled her ankle inwards and immediate pain and difficulty bearing weight to the lateral side of the foot as well as some pain in the posterior medial portion of the ankle. She reports the pain is worse with weightbearing and palpation, better with rest.  The patient does not have mechanical symptoms. The prior treatments have been none. The patient is not working. Patient denies any numbness, tingling, paresthesias. PAST MEDICAL HISTORY  Past Medical History:   Diagnosis Date    Diarrhea     RUQ discomfort        PAST SURGICAL HISTORY  Past Surgical History:   Procedure Laterality Date    ESOPHAGEAL MOTILITY STUDY N/A 6/17/2019    ESOPHAGEAL MOTILITY/MANOMETRY STUDY performed by Dione Sanchez DO at [facility] N/A 8/1/2019    LAPAROSCOPIC, ROBOTIC XI ASSISTED PARAESOPHAGEAL HERNIA REPAIR performed by Daniel Le MD at [address] [place] [address]  2007    UPPER GASTROINTESTINAL ENDOSCOPY N/A 5/21/2019    EGD BIOPSY performed by Radha Morales MD at [facility] ENDOSCOPY         FAMILY HISTORY   No family history on file.     SOCIAL HISTORY  Social History     Socioeconomic History    Marital status: Single     Spouse name: Not on file    Number of children: Not on file    Years of education: Not on file    Highest education level: Not on file   Occupational History    Not on file   Social Needs    Financial resource strain: Not on file    Food insecurity     Worry: Not on file     Inability: Not on file    Transportation needs     Medical: Not on file     Non-medical: Not on file   Tobacco Use    Smoking status: Current Every Day Smoker     Packs/day: 0.25    Smokeless tobacco: Never Used   Substance and Sexual Activity    Alcohol use: Yes     Comment: occasional    Drug use: Yes     Types: Marijuana     Comment: last 1 week ago    Sexual activity: Not on file   Lifestyle    Physical activity     Days per week: Not on file     Minutes per session: Not on file    Stress: Not on file   Relationships    Social connections     Talks on phone: Not on file     Gets together: Not on file     Attends Christianity service: Not on file     Active member of club or organization: Not on file     Attends meetings of clubs or organizations: Not on file     Relationship status: Not on file    Intimate partner violence     Fear of current or ex partner: Not on file     Emotionally abused: Not on file     Physically abused: Not on file     Forced sexual activity: Not on file   Other Topics Concern    Not on file   Social History Narrative    Not on file     Social History     Occupational History    Not on file   Tobacco Use    Smoking status: Current Every Day Smoker     Packs/day: 0.25    Smokeless tobacco: Never Used   Substance and Sexual Activity    Alcohol use: Yes     Comment: occasional    Drug use: Yes     Types: Marijuana     Comment: last 1 week ago    Sexual activity: Not on file       CURRENT MEDICATIONS     Current Outpatient Medications:     HYDROcodone-acetaminophen (NORCO) 5-325 MG per tablet, Take 1 tablet by mouth every 6 hours as needed for Pain for up to 3 days. , Disp: 12 tablet, Rfl: 0    ibuprofen (ADVIL;MOTRIN) 800 MG tablet, Take 1 tablet by mouth every 6 hours as needed for Pain, Disp: 20 tablet, Rfl: 0   Probiotic Product (ALIGN) 4 MG CAPS, as needed Last dose 5-17-19, Disp: , Rfl:     dicyclomine (BENTYL) 10 MG capsule, Take 10 mg by mouth as needed , Disp: , Rfl:     amitriptyline (ELAVIL) 10 MG tablet, Take 10 mg by mouth nightly, Disp: , Rfl:     sucralfate (CARAFATE) 1 GM/10ML suspension, Take 10 mLs by mouth 4 times daily (Patient not taking: Reported on 12/2/2020), Disp: 1200 mL, Rfl: 3    ALLERGIES  No Known Allergies    Controlled Substances Monitoring:        REVIEW OF SYSTEMS:     Constitutional:  Negative for weight loss, fevers, chills, fatigue  Cardiovascular: Negative for chest pain, palpitations  Pulmonary: Negative for shortness of breath, labored breathing, cough  GI: negative for abdominal pain, nausea, vomitting   MSK: per HPI  Skin: negative for rash, open wounds    All other systems reviewed and are negative       PHYSICAL EXAM     Vitals:    12/02/20 0817   Temp: 97.3 °F (36.3 °C)   TempSrc: Infrared   Weight: 200 lb (90.7 kg)   Height: 5' 3\" (1.6 m)       Height: 5' 3\" (1.6 m)  Weight: [unfilled]  BMI:  Body mass index is 35.43 kg/m². General: The patient is alert and oriented x 3, appears to be stated age and in no distress. HEENT: head is normocephalic, atraumatic. EOMI. Neck: supple, trachea midline, no thyromegaly   Cardiovascular: peripheral pulses palpable.   Normal Capillary refill   Respiratory: breathing unlabored, chest expansion symmetric   Skin: no rash, no open wounds, no erythema  Psych: normal affect; mood stable  Neurologic: gait normal, sensation grossly intact in extremities  MSK:    Ankle:   · Skin is intact circumferentially, increased swelling and bruising about the lateral foot as well as posterior around the calcaneus  · Positive TTP but the base of the fifth metatarsal, positive tenderness palpation about the medial mall, negative syndesmotic tenderness  · Compartments soft and compressible  · +5/5 GS/TA/EHL  · +2/4 DP & PT pulses, Cap refill <3 sec, Toes warm and perfused  · Distal sensation grossly intact to Peroneals, Tibial, Sural, Saphenous nrs       IMAGING:    XR: Xray of the Left ankle shows no fracture or other bony abnormality.     X-ray of left foot demonstrates zone 1 fracture of the fifth metatarsal base, minimally displaced, no rotational malalignment noted    Imaging discussed with patient    ASSESSMENT  Left zone 1 5thmetatarsal base fracture    PLAN  Plan for protected weightbearing in a cam boot  Ice to the extremity as needed  NSAIDs for pain control  Use assistive devices for ambulation as needed although is not necessary  Return in 4 weeks for follow-up x-rays, if she starts to better she can slowly wean herself out of the boot  Call office with any problems  Discussed with Dr. Duane Eason MD  Orthopaedic Surgery   12/2/20  8:36 AM

## 2021-03-05 ENCOUNTER — OFFICE VISIT (OUTPATIENT)
Dept: ORTHOPEDIC SURGERY | Age: 36
End: 2021-03-05
Payer: MEDICAID

## 2021-03-05 VITALS — HEIGHT: 63 IN | BODY MASS INDEX: 35.44 KG/M2 | WEIGHT: 200 LBS

## 2021-03-05 DIAGNOSIS — S92.352A CLOSED DISPLACED FRACTURE OF FIFTH METATARSAL BONE OF LEFT FOOT, INITIAL ENCOUNTER: Primary | ICD-10-CM

## 2021-03-05 PROCEDURE — G8484 FLU IMMUNIZE NO ADMIN: HCPCS | Performed by: ORTHOPAEDIC SURGERY

## 2021-03-05 PROCEDURE — G8427 DOCREV CUR MEDS BY ELIG CLIN: HCPCS | Performed by: ORTHOPAEDIC SURGERY

## 2021-03-05 PROCEDURE — 4004F PT TOBACCO SCREEN RCVD TLK: CPT | Performed by: ORTHOPAEDIC SURGERY

## 2021-03-05 PROCEDURE — 99213 OFFICE O/P EST LOW 20 MIN: CPT | Performed by: ORTHOPAEDIC SURGERY

## 2021-03-05 PROCEDURE — G8417 CALC BMI ABV UP PARAM F/U: HCPCS | Performed by: ORTHOPAEDIC SURGERY

## 2021-03-05 RX ORDER — IBUPROFEN 200 MG
200 TABLET ORAL EVERY 6 HOURS PRN
Status: ON HOLD | COMMUNITY
End: 2022-06-07 | Stop reason: HOSPADM

## 2021-03-05 NOTE — PROGRESS NOTES
Follow Up Visit     Israel Figueroa returns today for follow up visit regarding Left zone 1 5thmetatarsal base fracture. We have not seen her since initial appointment on 12/2/2020, due to family ([place] +). Treatment thus far has included protected weightbearing in a cam boot, ice to the extremity as needed, NSAIDs for pain control. She comes to appointment today in a normal shoe (slide / sandal) stating her foot is still bothersome / painful. REVIEW OF SYSTEMS:     General: Negative Fever, chills, fatigue   Cardiovascular: Negative chest pain, palpitations  Respiratory: Equal chest expansion, negative shortness of breath   Skin: Negative rash, open wounds  Psych: Normal affect, mood stable  Neurologic: sensation grossly intact in extremities   Musculoskeletal: See HPI        Physical Exam:     Height: 5' 3\" (1.6 m), Weight: 200 lb (90.7 kg)    General: Alert and oriented x3, no acute distress  Cardiovascular/pulmonary: No labored breathing, peripheral perfusion intact  Musculoskeletal:    Foot/Ankle:   Left Ankle exam displays no swelling, no ecchymosis. There is no deformity. Range of motion is 10 degrees dorsiflexion, 45 degrees plantarflexion. Resisted external rotation is negative. Resisted internal rotation is negative. Palpation of the lateral malleolus reveals no tenderness. Palpation of the medial malleolus reveals no tenderness. Palpation ATFL reveals no tenderness. Palpation over deltoid ligament reveals no tenderness. Palpation over the 5th metartarsal reveals moderate tenderness. Palpation of the achilles tendon reveals no tenderness   Subtalar is not limited and is not painful. Controlled Substances Monitoring:      Imaging:  New imaging including 3 views of the left foot minimally displaced fifth metatarsal fracture evidence of bone remodeling present around fracture site, thinning of fracture pattern when compared to previous imaging. Impression: Healing fifth metatarsal fracture      Assessment: Fifth metatarsal fracture      Plan: Today we discussed her left foot. Patient is 3 months out from injury resulting in fracture of her left fifth metatarsal.  She has been full weightbearing as tolerated. Patient does complain of tenderness over the fracture site during her visit today. Imaging reviewed shows evidence of bone remodeling around fracture site. Fracture remains in anatomic alignment. Patient can wear postoperative shoe for comfort purposes at this time. She has tolerated normal shoe well. She has no restrictions and may get begin resuming all activities. Patient verbalized understanding. She will follow-up as needed. Gui Reza, [address] [facility]  Orthopedic Surgery   03/05/21  9:48 AM    Staff Addendum    I have seen and evaluated the patient and agree with the assessment and plan as documented by Gui Reza CNP. I have performed the key components of the history and physical examination and concur with the findings and plan, and have made changes where appropriate/necessary.           Gerry Shay MD  [address] [place] [address]

## 2022-06-04 ENCOUNTER — APPOINTMENT (OUTPATIENT)
Dept: CT IMAGING | Age: 37
DRG: 812 | End: 2022-06-04
Payer: MEDICAID

## 2022-06-04 ENCOUNTER — APPOINTMENT (OUTPATIENT)
Dept: GENERAL RADIOLOGY | Age: 37
DRG: 812 | End: 2022-06-04
Payer: MEDICAID

## 2022-06-04 ENCOUNTER — HOSPITAL ENCOUNTER (INPATIENT)
Age: 37
LOS: 3 days | Discharge: HOME | DRG: 812 | End: 2022-06-07
Attending: EMERGENCY MEDICINE | Admitting: FAMILY MEDICINE
Payer: MEDICAID

## 2022-06-04 DIAGNOSIS — F19.10 DRUG ABUSE (HCC): ICD-10-CM

## 2022-06-04 DIAGNOSIS — D72.829 LEUKOCYTOSIS, UNSPECIFIED TYPE: ICD-10-CM

## 2022-06-04 DIAGNOSIS — T40.601A OPIATE OVERDOSE, ACCIDENTAL OR UNINTENTIONAL, INITIAL ENCOUNTER (HCC): Primary | ICD-10-CM

## 2022-06-04 PROBLEM — T50.901A DRUG OVERDOSE, ACCIDENTAL OR UNINTENTIONAL, INITIAL ENCOUNTER: Status: ACTIVE | Noted: 2022-06-04

## 2022-06-04 LAB
ANION GAP SERPL CALCULATED.3IONS-SCNC: 15 MMOL/L (ref 7–16)
B.E.: -0.7 MMOL/L (ref -3–3)
BACTERIA: ABNORMAL /HPF
BASOPHILS ABSOLUTE: 0.06 E9/L (ref 0–0.2)
BASOPHILS RELATIVE PERCENT: 0.2 % (ref 0–2)
BILIRUBIN URINE: NEGATIVE
BLOOD, URINE: ABNORMAL
BUN BLDV-MCNC: 8 MG/DL (ref 6–20)
CALCIUM SERPL-MCNC: 9.5 MG/DL (ref 8.6–10.2)
CHLORIDE BLD-SCNC: 97 MMOL/L (ref 98–107)
CLARITY: CLEAR
CO2: 23 MMOL/L (ref 22–29)
COHB: 1.7 % (ref 0–1.5)
COLOR: YELLOW
CREAT SERPL-MCNC: 0.8 MG/DL (ref 0.5–1)
CRITICAL: ABNORMAL
DATE ANALYZED: ABNORMAL
DATE OF COLLECTION: ABNORMAL
EOSINOPHILS ABSOLUTE: 0.01 E9/L (ref 0.05–0.5)
EOSINOPHILS RELATIVE PERCENT: 0 % (ref 0–6)
GFR AFRICAN AMERICAN: >60
GFR NON-AFRICAN AMERICAN: >60 ML/MIN/1.73
GLUCOSE BLD-MCNC: 116 MG/DL (ref 74–99)
GLUCOSE URINE: 250 MG/DL
HCG, URINE, POC: NEGATIVE
HCO3: 26 MMOL/L (ref 22–26)
HCT VFR BLD CALC: 43 % (ref 34–48)
HEMOGLOBIN: 14.2 G/DL (ref 11.5–15.5)
HHB: 3.8 % (ref 0–5)
IMMATURE GRANULOCYTES #: 0.2 E9/L
IMMATURE GRANULOCYTES %: 0.8 % (ref 0–5)
KETONES, URINE: NEGATIVE MG/DL
LAB: ABNORMAL
LEUKOCYTE ESTERASE, URINE: ABNORMAL
LYMPHOCYTES ABSOLUTE: 1.73 E9/L (ref 1.5–4)
LYMPHOCYTES RELATIVE PERCENT: 7.1 % (ref 20–42)
Lab: ABNORMAL
Lab: NORMAL
MCH RBC QN AUTO: 31.9 PG (ref 26–35)
MCHC RBC AUTO-ENTMCNC: 33 % (ref 32–34.5)
MCV RBC AUTO: 96.6 FL (ref 80–99.9)
METHB: 0.3 % (ref 0–1.5)
MODE: ABNORMAL
MONOCYTES ABSOLUTE: 1.75 E9/L (ref 0.1–0.95)
MONOCYTES RELATIVE PERCENT: 7.2 % (ref 2–12)
NEGATIVE QC PASS/FAIL: NORMAL
NEUTROPHILS ABSOLUTE: 20.67 E9/L (ref 1.8–7.3)
NEUTROPHILS RELATIVE PERCENT: 84.7 % (ref 43–80)
NITRITE, URINE: POSITIVE
O2 CONTENT: 17.4 ML/DL
O2 SATURATION: 96.1 % (ref 92–98.5)
O2HB: 94.2 % (ref 94–97)
OPERATOR ID: 7874
PATIENT TEMP: 37 C
PCO2: 51.4 MMHG (ref 35–45)
PDW BLD-RTO: 12.7 FL (ref 11.5–15)
PH BLOOD GAS: 7.32 (ref 7.35–7.45)
PH UA: 6 (ref 5–9)
PLATELET # BLD: 399 E9/L (ref 130–450)
PMV BLD AUTO: 9.7 FL (ref 7–12)
PO2: 85.8 MMHG (ref 75–100)
POIKILOCYTES: ABNORMAL
POSITIVE QC PASS/FAIL: NORMAL
POTASSIUM SERPL-SCNC: 4 MMOL/L (ref 3.5–5)
PROTEIN UA: 100 MG/DL
RBC # BLD: 4.45 E12/L (ref 3.5–5.5)
RBC UA: ABNORMAL /HPF (ref 0–2)
SODIUM BLD-SCNC: 135 MMOL/L (ref 132–146)
SOURCE, BLOOD GAS: ABNORMAL
SPECIFIC GRAVITY UA: >=1.03 (ref 1–1.03)
STOMATOCYTES: ABNORMAL
THB: 13.1 G/DL (ref 11.5–16.5)
TIME ANALYZED: 1808
TOTAL CK: 139 U/L (ref 20–180)
TROPONIN, HIGH SENSITIVITY: 22 NG/L (ref 0–9)
TROPONIN, HIGH SENSITIVITY: 24 NG/L (ref 0–9)
UROBILINOGEN, URINE: 0.2 E.U./DL
WBC # BLD: 24.4 E9/L (ref 4.5–11.5)
WBC UA: ABNORMAL /HPF (ref 0–5)

## 2022-06-04 PROCEDURE — 82805 BLOOD GASES W/O2 SATURATION: CPT

## 2022-06-04 PROCEDURE — 36600 WITHDRAWAL OF ARTERIAL BLOOD: CPT

## 2022-06-04 PROCEDURE — 82550 ASSAY OF CK (CPK): CPT

## 2022-06-04 PROCEDURE — 80307 DRUG TEST PRSMV CHEM ANLYZR: CPT

## 2022-06-04 PROCEDURE — 6360000002 HC RX W HCPCS: Performed by: EMERGENCY MEDICINE

## 2022-06-04 PROCEDURE — 94660 CPAP INITIATION&MGMT: CPT

## 2022-06-04 PROCEDURE — 71250 CT THORAX DX C-: CPT

## 2022-06-04 PROCEDURE — 96376 TX/PRO/DX INJ SAME DRUG ADON: CPT

## 2022-06-04 PROCEDURE — 6360000002 HC RX W HCPCS: Performed by: FAMILY MEDICINE

## 2022-06-04 PROCEDURE — 84484 ASSAY OF TROPONIN QUANT: CPT

## 2022-06-04 PROCEDURE — 99223 1ST HOSP IP/OBS HIGH 75: CPT | Performed by: FAMILY MEDICINE

## 2022-06-04 PROCEDURE — 96365 THER/PROPH/DIAG IV INF INIT: CPT

## 2022-06-04 PROCEDURE — 36415 COLL VENOUS BLD VENIPUNCTURE: CPT

## 2022-06-04 PROCEDURE — 6360000002 HC RX W HCPCS: Performed by: STUDENT IN AN ORGANIZED HEALTH CARE EDUCATION/TRAINING PROGRAM

## 2022-06-04 PROCEDURE — 93005 ELECTROCARDIOGRAM TRACING: CPT | Performed by: STUDENT IN AN ORGANIZED HEALTH CARE EDUCATION/TRAINING PROGRAM

## 2022-06-04 PROCEDURE — 70450 CT HEAD/BRAIN W/O DYE: CPT

## 2022-06-04 PROCEDURE — 2580000003 HC RX 258: Performed by: EMERGENCY MEDICINE

## 2022-06-04 PROCEDURE — 85025 COMPLETE CBC W/AUTO DIFF WBC: CPT

## 2022-06-04 PROCEDURE — 2000000000 HC ICU R&B

## 2022-06-04 PROCEDURE — 81001 URINALYSIS AUTO W/SCOPE: CPT

## 2022-06-04 PROCEDURE — 99285 EMERGENCY DEPT VISIT HI MDM: CPT

## 2022-06-04 PROCEDURE — 71045 X-RAY EXAM CHEST 1 VIEW: CPT

## 2022-06-04 PROCEDURE — 80048 BASIC METABOLIC PNL TOTAL CA: CPT

## 2022-06-04 RX ORDER — NALOXONE HYDROCHLORIDE 0.4 MG/ML
2 INJECTION, SOLUTION INTRAMUSCULAR; INTRAVENOUS; SUBCUTANEOUS ONCE
Status: COMPLETED | OUTPATIENT
Start: 2022-06-04 | End: 2022-06-04

## 2022-06-04 RX ORDER — ENOXAPARIN SODIUM 100 MG/ML
40 INJECTION SUBCUTANEOUS DAILY
Status: DISCONTINUED | OUTPATIENT
Start: 2022-06-04 | End: 2022-06-07 | Stop reason: HOSPADM

## 2022-06-04 RX ORDER — SODIUM CHLORIDE 0.9 % (FLUSH) 0.9 %
5-40 SYRINGE (ML) INJECTION EVERY 12 HOURS SCHEDULED
Status: DISCONTINUED | OUTPATIENT
Start: 2022-06-04 | End: 2022-06-04 | Stop reason: SDUPTHER

## 2022-06-04 RX ORDER — ACETAMINOPHEN 325 MG/1
650 TABLET ORAL EVERY 6 HOURS PRN
Status: DISCONTINUED | OUTPATIENT
Start: 2022-06-04 | End: 2022-06-07 | Stop reason: HOSPADM

## 2022-06-04 RX ORDER — POLYETHYLENE GLYCOL 3350 17 G/17G
17 POWDER, FOR SOLUTION ORAL DAILY PRN
Status: DISCONTINUED | OUTPATIENT
Start: 2022-06-04 | End: 2022-06-07 | Stop reason: HOSPADM

## 2022-06-04 RX ORDER — ONDANSETRON 4 MG/1
4 TABLET, ORALLY DISINTEGRATING ORAL EVERY 8 HOURS PRN
Status: DISCONTINUED | OUTPATIENT
Start: 2022-06-04 | End: 2022-06-04 | Stop reason: SDUPTHER

## 2022-06-04 RX ORDER — NICOTINE 21 MG/24HR
1 PATCH, TRANSDERMAL 24 HOURS TRANSDERMAL DAILY
Status: DISCONTINUED | OUTPATIENT
Start: 2022-06-04 | End: 2022-06-07 | Stop reason: HOSPADM

## 2022-06-04 RX ORDER — SODIUM CHLORIDE 9 MG/ML
INJECTION, SOLUTION INTRAVENOUS PRN
Status: DISCONTINUED | OUTPATIENT
Start: 2022-06-04 | End: 2022-06-04 | Stop reason: SDUPTHER

## 2022-06-04 RX ORDER — LACTOBACILLUS RHAMNOSUS GG 10B CELL
1 CAPSULE ORAL DAILY
Status: DISCONTINUED | OUTPATIENT
Start: 2022-06-04 | End: 2022-06-07 | Stop reason: HOSPADM

## 2022-06-04 RX ORDER — ACETAMINOPHEN 325 MG/1
650 TABLET ORAL EVERY 6 HOURS PRN
Status: DISCONTINUED | OUTPATIENT
Start: 2022-06-04 | End: 2022-06-04 | Stop reason: SDUPTHER

## 2022-06-04 RX ORDER — POLYETHYLENE GLYCOL 3350 17 G/17G
17 POWDER, FOR SOLUTION ORAL DAILY PRN
Status: DISCONTINUED | OUTPATIENT
Start: 2022-06-04 | End: 2022-06-04 | Stop reason: SDUPTHER

## 2022-06-04 RX ORDER — ENOXAPARIN SODIUM 100 MG/ML
40 INJECTION SUBCUTANEOUS DAILY
Status: DISCONTINUED | OUTPATIENT
Start: 2022-06-04 | End: 2022-06-04 | Stop reason: SDUPTHER

## 2022-06-04 RX ORDER — ONDANSETRON 2 MG/ML
4 INJECTION INTRAMUSCULAR; INTRAVENOUS EVERY 6 HOURS PRN
Status: DISCONTINUED | OUTPATIENT
Start: 2022-06-04 | End: 2022-06-07 | Stop reason: HOSPADM

## 2022-06-04 RX ORDER — DIPHENHYDRAMINE HCL 25 MG
25 TABLET ORAL EVERY 6 HOURS PRN
Status: DISCONTINUED | OUTPATIENT
Start: 2022-06-04 | End: 2022-06-07 | Stop reason: HOSPADM

## 2022-06-04 RX ORDER — SODIUM CHLORIDE 0.9 % (FLUSH) 0.9 %
5-40 SYRINGE (ML) INJECTION EVERY 12 HOURS SCHEDULED
Status: DISCONTINUED | OUTPATIENT
Start: 2022-06-04 | End: 2022-06-07 | Stop reason: HOSPADM

## 2022-06-04 RX ORDER — ACETAMINOPHEN 650 MG/1
650 SUPPOSITORY RECTAL EVERY 6 HOURS PRN
Status: DISCONTINUED | OUTPATIENT
Start: 2022-06-04 | End: 2022-06-07 | Stop reason: HOSPADM

## 2022-06-04 RX ORDER — ACETAMINOPHEN 650 MG/1
650 SUPPOSITORY RECTAL EVERY 6 HOURS PRN
Status: DISCONTINUED | OUTPATIENT
Start: 2022-06-04 | End: 2022-06-04 | Stop reason: SDUPTHER

## 2022-06-04 RX ORDER — SODIUM CHLORIDE 9 MG/ML
INJECTION, SOLUTION INTRAVENOUS PRN
Status: DISCONTINUED | OUTPATIENT
Start: 2022-06-04 | End: 2022-06-07 | Stop reason: HOSPADM

## 2022-06-04 RX ORDER — ONDANSETRON 2 MG/ML
4 INJECTION INTRAMUSCULAR; INTRAVENOUS EVERY 6 HOURS PRN
Status: DISCONTINUED | OUTPATIENT
Start: 2022-06-04 | End: 2022-06-04 | Stop reason: SDUPTHER

## 2022-06-04 RX ORDER — ALBUTEROL SULFATE 2.5 MG/3ML
2.5 SOLUTION RESPIRATORY (INHALATION) EVERY 4 HOURS PRN
Status: DISCONTINUED | OUTPATIENT
Start: 2022-06-04 | End: 2022-06-05

## 2022-06-04 RX ORDER — SODIUM CHLORIDE 0.9 % (FLUSH) 0.9 %
5-40 SYRINGE (ML) INJECTION PRN
Status: DISCONTINUED | OUTPATIENT
Start: 2022-06-04 | End: 2022-06-07 | Stop reason: HOSPADM

## 2022-06-04 RX ORDER — ONDANSETRON 4 MG/1
4 TABLET, ORALLY DISINTEGRATING ORAL EVERY 8 HOURS PRN
Status: DISCONTINUED | OUTPATIENT
Start: 2022-06-04 | End: 2022-06-07 | Stop reason: HOSPADM

## 2022-06-04 RX ORDER — SODIUM CHLORIDE 0.9 % (FLUSH) 0.9 %
5-40 SYRINGE (ML) INJECTION PRN
Status: DISCONTINUED | OUTPATIENT
Start: 2022-06-04 | End: 2022-06-04 | Stop reason: SDUPTHER

## 2022-06-04 RX ADMIN — NALOXONE HYDROCHLORIDE 2 MG: 0.4 INJECTION, SOLUTION INTRAMUSCULAR; INTRAVENOUS; SUBCUTANEOUS at 08:45

## 2022-06-04 RX ADMIN — NALOXONE HYDROCHLORIDE 0.4 MG/HR: 1 INJECTION PARENTERAL at 22:12

## 2022-06-04 RX ADMIN — NALOXONE HYDROCHLORIDE 0.4 MG/HR: 1 INJECTION PARENTERAL at 12:22

## 2022-06-04 RX ADMIN — NALOXONE HYDROCHLORIDE 2 MG: 0.4 INJECTION, SOLUTION INTRAMUSCULAR; INTRAVENOUS; SUBCUTANEOUS at 10:39

## 2022-06-04 RX ADMIN — ONDANSETRON 4 MG: 2 INJECTION INTRAMUSCULAR; INTRAVENOUS at 18:13

## 2022-06-04 RX ADMIN — NALOXONE HYDROCHLORIDE 0.4 MG/HR: 1 INJECTION PARENTERAL at 10:48

## 2022-06-04 RX ADMIN — NALOXONE HYDROCHLORIDE 0.4 MG/HR: 1 INJECTION PARENTERAL at 17:04

## 2022-06-04 ASSESSMENT — PAIN - FUNCTIONAL ASSESSMENT: PAIN_FUNCTIONAL_ASSESSMENT: NONE - DENIES PAIN

## 2022-06-04 ASSESSMENT — ENCOUNTER SYMPTOMS
EYE REDNESS: 0
COUGH: 0
SINUS PRESSURE: 0
SORE THROAT: 0
DIARRHEA: 0
NAUSEA: 0
SHORTNESS OF BREATH: 0
EYE PAIN: 0
ABDOMINAL DISTENTION: 0
BACK PAIN: 0
EYE DISCHARGE: 0
VOMITING: 0
WHEEZING: 0

## 2022-06-04 NOTE — ED NOTES
[facility] faxed, floor called to verify retrieval, spoke with [facility], patient to go to CT.       Tre Castanon RN  06/04/22 [id number]

## 2022-06-04 NOTE — H&P
[facility] History and Physical      CHIEF COMPLAINT:    Drug overdose    History of Present Illness:   Patient was with a friend and was snorting what she thought was cocaine. It turned out to be heroin. A few minutes later she went unresponsive. Her friend called 911 when unsuccessful to arouse her. EMS came and patient got a total of 4 mg of Narcan. She was brought to the emergency room. In the emergency room she required multiple doses of Narcan. A decision was made to place her on a Narcan drip and admit her. She was able to report:  She did not have any allergies to any medications. She was not taking any medicines. She reports her only surgery was a LEEP. Family history is positive for cancer. She has 6 children for which they are all doing well and cared for. Ages are 21, 16, 13, 5 and 7. She did not get the flu or the COVID-19 vaccine. Unable to get review of systems as frequently falls asleep. Informant(s) for H&P: Electronic health record. Patient. REVIEW OF SYSTEMS:  A comprehensive review of systems was negative except for: what is in the HPI    PMH:  Past Medical History:   Diagnosis Date    Diarrhea     RUQ discomfort        Surgical History:  Past Surgical History:   Procedure Laterality Date    ESOPHAGEAL MOTILITY STUDY N/A 6/17/2019    ESOPHAGEAL MOTILITY/MANOMETRY STUDY performed by Babar Hardwick DO at [place] [id number] N/A 8/1/2019    LAPAROSCOPIC, ROBOTIC XI ASSISTED PARAESOPHAGEAL HERNIA REPAIR performed by Gabo Reza MD at [address] [place] [address]  2007    UPPER GASTROINTESTINAL ENDOSCOPY N/A 5/21/2019    EGD BIOPSY performed by Chana Hayden MD at [facility] ENDOSCOPY       Medications Prior to Admission: Patient denies taking any medications    Prior to Admission medications    Medication Sig Start Date End Date Taking?  Authorizing Provider   ibuprofen (ADVIL;MOTRIN) 200 MG tablet Take 200 mg by mouth every 6 hours as needed for Pain  Patient not taking: Reported on 6/4/2022    Historical Provider, MD   ibuprofen (ADVIL;MOTRIN) 800 MG tablet Take 1 tablet by mouth every 6 hours as needed for Pain 10/3/19 12/2/20  THI Tran   amitriptyline (ELAVIL) 10 MG tablet Take 10 mg by mouth nightly  Patient not taking: Reported on 6/4/2022    Historical Provider, MD   sucralfate (CARAFATE) 1 GM/10ML suspension Take 10 mLs by mouth 4 times daily  Patient not taking: Reported on 12/2/2020 5/21/19   Yumiko Bonilla MD   Probiotic Product (ALIGN) 4 MG CAPS as needed Last dose 5-17-19  Patient not taking: Reported on 6/4/2022 5/13/19   Historical Provider, MD   dicyclomine (BENTYL) 10 MG capsule Take 10 mg by mouth as needed   Patient not taking: Reported on 6/4/2022 5/13/19   Historical Provider, MD     Allergies:    Patient has no known allergies. Social History:    reports that she has been smoking. She has been smoking about 0.25 packs per day. She has never used smokeless tobacco. She reports current alcohol use. She reports current drug use. Drugs: Marijuana Elfrieda Gomez), Cocaine, and Opiates . Family History:   family history is not on file. See above      PHYSICAL EXAM:  Vitals:  BP (!) 153/92   Pulse 60   Temp 97.6 °F (36.4 °C) (Oral)   Resp 14   Ht 5' 3\" (1.6 m)   Wt 200 lb (90.7 kg)   LMP 05/28/2022   SpO2 99%   BMI 35.43 kg/m²     General Appearance: Lethargic but arousable.   In no acute distress  Skin: warm and dry  Head: normocephalic and atraumatic  Eyes: pupils equal, round, and reactive to light, extraocular eye movements intact, conjunctivae normal  Neck: neck supple and non tender without mass   Pulmonary/Chest: clear to auscultation bilaterally, normal air movement, no respiratory distress  Cardiovascular: normal rate, normal S1 and S2 and no carotid bruits  Abdomen: soft, non-tender, non-distended, normal bowel sounds, no masses or organomegaly  Extremities: no cyanosis, no clubbing and no edema  Neurologic: no cranial nerve deficit and speech normal    LABS:  Recent Labs     06/04/22  1054      K 4.0   CL 97*   CO2 23   BUN 8   CREATININE 0.8   GLUCOSE 116*   CALCIUM 9.5       Recent Labs     06/04/22  1054   WBC 24.4*   RBC 4.45   HGB 14.2   HCT 43.0   MCV 96.6   MCH 31.9   MCHC 33.0   RDW 12.7      MPV 9.7     U/A:    Lab Results   Component Value Date    COLORU Yellow 06/04/2022    PROTEINU 100 06/04/2022    PHUR 6.0 06/04/2022    WBCUA NONE 06/04/2022    RBCUA NONE 06/04/2022    BACTERIA MANY 06/04/2022    CLARITYU Clear 06/04/2022    SPECGRAV >=1.030 06/04/2022    LEUKOCYTESUR TRACE 06/04/2022    UROBILINOGEN 0.2 06/04/2022    BILIRUBINUR Negative 06/04/2022    BLOODU TRACE-INTACT 06/04/2022    GLUCOSEU 250 06/04/2022       Radiology:   XR CHEST PORTABLE   Final Result   No acute process. CT Head WO Contrast    (Results Pending)   CT CHEST WO CONTRAST    (Results Pending)     EKG:   Sinus bradycardia  Otherwise normal ECG  When compared with ECG of 04-JUN-2022 07:31,  No significant change was found    ASSESSMENT:      Principal Problem:    Drug overdose, accidental or unintentional, initial encounter  Resolved Problems:    * No resolved hospital problems. *    PLAN:  1. Drug overdose -continue Narcan drip. Monitor vitals. Treat accordingly. Urine drug screen. 2.  Tobacco use -NicoDerm patch    Code Status: Full  DVT prophylaxis: Lovenox    NOTE: This report was transcribed using voice recognition software. Every effort was made to ensure accuracy; however, inadvertent computerized transcription errors may be present.     Electronically signed by Mary Ojeda MD on 6/4/2022 at 2:43 PM

## 2022-06-04 NOTE — SIGNIFICANT EVENT
Notified by nursing that patient is more lethargic and requiring stimulation to wake up. O2 sats were < 90.  Currently on Narcan 0.4 mg/hr IV gtt. Ines aCm, Dr. Sedrick Felty who has accepted patient to the ICU. Transfer order and ICU orders placed. Anesthesiologist on call notified of the potential need for intubation. ICU staff also alerted. Currently, patient able to answer questions but cannot stay awake for only a few seconds . ICU bed available. Transfer imminent.

## 2022-06-04 NOTE — ED PROVIDER NOTES
The history is provided by the patient, medical records and the EMS personnel. 49-year-old female presents to the emergency department with complaint of drug overdose. Patient states she snorted cocaine and then several minutes later became unresponsive not breathing well. Got 6 mg total of Narcan to intranasal for milligrams IV and improved with that. Patient is awake and alert at this time. Able to answer my questions. She has no other acute complaints. She states currently in drug court and try to get the drugs out of her system and has no other acute complaints. She does also use marijuana as well. Otherwise no other acute complaints this time. The patient presents with drug overdose that has been going on for 1 day. These symptoms are moderate in severity. Symptoms are made better by nothng. Symptoms are made worse by nothnig. Associated symptoms include none. Review of Systems   Constitutional: Negative for chills and fever. HENT: Negative for ear pain, sinus pressure and sore throat. Eyes: Negative for pain, discharge and redness. Respiratory: Negative for cough, shortness of breath and wheezing. Cardiovascular: Negative for chest pain. Gastrointestinal: Negative for abdominal distention, diarrhea, nausea and vomiting. Genitourinary: Negative for dysuria and frequency. Musculoskeletal: Negative for arthralgias and back pain. Skin: Negative for rash and wound. Neurological: Negative for weakness and headaches. Hematological: Negative for adenopathy. All other systems reviewed and are negative. Physical Exam  Vitals and nursing note reviewed. Constitutional:       Appearance: Normal appearance. She is normal weight. HENT:      Head: Normocephalic and atraumatic. Eyes:      Conjunctiva/sclera: Conjunctivae normal.   Cardiovascular:      Rate and Rhythm: Normal rate and regular rhythm. Pulses: Normal pulses. Heart sounds: Normal heart sounds.  No murmur heard. No gallop. Pulmonary:      Effort: Pulmonary effort is normal. No respiratory distress. Breath sounds: Normal breath sounds. No wheezing or rales. Abdominal:      General: Abdomen is flat. Bowel sounds are normal. There is no distension. Palpations: Abdomen is soft. Tenderness: There is no abdominal tenderness. There is no guarding. Skin:     General: Skin is warm and dry. Capillary Refill: Capillary refill takes less than 2 seconds. Neurological:      General: No focal deficit present. Mental Status: She is alert and oriented to person, place, and time. Cranial Nerves: No cranial nerve deficit. Sensory: No sensory deficit. Motor: No weakness. Psychiatric:         Mood and Affect: Mood normal.         Thought Content: Thought content normal.          Procedures     MDM     66-year-old female presents emergency department complaint of drug overdose. Patient requiring multiple doses of Narcan here in the emergency department however on repeat examinations was still difficult to arouse and respirations were very minimal.  Due to this patient was started Narcan drip with improvement. Patient's laboratory work-up did show a leukocytosis most likely from her apnea prior to arriving to the emergency department. Did speak with hospitalist who is agreeable with admission. He did ask for additional CAT scans and blood work which were done. Patient otherwise hemodynamically stable. And is agreeable for admission. ED Course as of 06/04/22 1352   Sat Jun 04, 2022   0712 ATTENDING PROVIDER ATTESTATION:     I have personally performed and/or participated in the history, exam, medical decision making, and procedures and agree with all pertinent clinical information unless otherwise noted. I have also reviewed and agree with the past medical, family and social history unless otherwise noted.     I have discussed this patient in detail with the resident and provided the instruction and education regarding the evidence-based evaluation and treatment of overdose. History: Patient reportedly snorted heroin today. She states she thought was cocaine. She became unresponsive. Her friend poured water on her without improvement. And then called EMS. EMS found her with a pulse but unresponsive. Poor respirations. They administered 4 mg of Narcan intranasally and followed that with 2 mg IV. She returned to awake, alert and oriented state. By arrival, no complaints. My findings: Karen Villar is a 40 y.o. female whom is in no distress. Physical exam reveals she is alert and oriented. Heart is regular, lungs are coarse. Abdomen is soft and nontender. Extremities are intact without edema. Skin is warm dry and wet (secondary to water having been poured on her). Good distal pulses and capillary refill. My plan: Symptomatic and supportive care. X-ray, EKG, observation. Electronically signed by Kristel Finnegan DO on 6/4/22 at 7:12 AM EDT       [TG]   2122 EKG: This EKG is signed and interpreted by me. Rate: 63  Rhythm: Sinus  Interpretation: no acute changes  Comparison: no previous   [TG]   0846 Notified at this time that the patient has started to become unresponsive again and mildly hypoxic. Oxygen applied. Patient evaluated. She will awaken to painful stimuli but, immediately becomes unresponsive again. Additional Narcan ordered. Of note, patient's purse is next to her at this time. It was not there previously, and had been on the desk. Its unknown if she may have coming to contact with additional drugs or if simply the previously given Narcan had worn off. [TG]   0848 EKG: This EKG is signed by emergency department physician.     Rate: 58  Rhythm: Sinus  Interpretation: No acute ST elevation or depression sinus rhythm normal axis  Comparison: stable as compared to patient's most recent EKG      [CB]   1035 Patient again found to be unresponsive. Narcan infusion ordered. [TG]   1038 Patient will give additional Narcan bolus as well as be started on Narcan infusion. [CB]   1211 Discussed admission with the patient. She refuses to be admitted. She just had a dose of Narcan and is currently alert and oriented. We discussed the risks of such decision. She is able to relate back understanding. She request to stay here in the emergency department for couple more hours. We will keep her here and continue observation. [TG]   1217 Called the patient's bedside. She is now willing to be admitted. She understands that she will have to stay until discharge. She agrees to do so. [TG]   1218 Patient is willing to be admitted at this time. [CB]   1229 Dr gaming agree with admission   [CB]      ED Course User Index  [CB] Marvin Schwab MD  [TG] Roshan Hartman, DO          ED Course as of 06/04/22 1352   Sat Jun 04, 2022   0712 ATTENDING PROVIDER ATTESTATION:     I have personally performed and/or participated in the history, exam, medical decision making, and procedures and agree with all pertinent clinical information unless otherwise noted. I have also reviewed and agree with the past medical, family and social history unless otherwise noted. I have discussed this patient in detail with the resident and provided the instruction and education regarding the evidence-based evaluation and treatment of overdose. History: Patient reportedly snorted heroin today. She states she thought was cocaine. She became unresponsive. Her friend poured water on her without improvement. And then called EMS. EMS found her with a pulse but unresponsive. Poor respirations. They administered 4 mg of Narcan intranasally and followed that with 2 mg IV. She returned to awake, alert and oriented state. By arrival, no complaints. My findings: Jose Oreilly is a 40 y.o. female whom is in no distress. Physical exam reveals she is alert and oriented.   Heart is regular, lungs are coarse. Abdomen is soft and nontender. Extremities are intact without edema. Skin is warm dry and wet (secondary to water having been poured on her). Good distal pulses and capillary refill. My plan: Symptomatic and supportive care. X-ray, EKG, observation. Electronically signed by Emma Addison DO on 6/4/22 at 7:12 AM EDT       [TG]   1729 EKG: This EKG is signed and interpreted by me. Rate: 63  Rhythm: Sinus  Interpretation: no acute changes  Comparison: no previous   [TG]   0846 Notified at this time that the patient has started to become unresponsive again and mildly hypoxic. Oxygen applied. Patient evaluated. She will awaken to painful stimuli but, immediately becomes unresponsive again. Additional Narcan ordered. Of note, patient's purse is next to her at this time. It was not there previously, and had been on the desk. Its unknown if she may have coming to contact with additional drugs or if simply the previously given Narcan had worn off. [TG]   0848 EKG: This EKG is signed by emergency department physician. Rate: 58  Rhythm: Sinus  Interpretation: No acute ST elevation or depression sinus rhythm normal axis  Comparison: stable as compared to patient's most recent EKG      [CB]   1035 Patient again found to be unresponsive. Narcan infusion ordered. [TG]   1038 Patient will give additional Narcan bolus as well as be started on Narcan infusion. [CB]   1211 Discussed admission with the patient. She refuses to be admitted. She just had a dose of Narcan and is currently alert and oriented. We discussed the risks of such decision. She is able to relate back understanding. She request to stay here in the emergency department for couple more hours. We will keep her here and continue observation. [TG]   1217 Called the patient's bedside. She is now willing to be admitted. She understands that she will have to stay until discharge. She agrees to do so.  [TG] 1218 Patient is willing to be admitted at this time. [CB]   1229 Dr gaming agree with admission   [CB]      ED Course User Index  [CB] Howard Grover MD  [TG] April Love DO     ED Course as of 06/04/22 1352   Sat Jun 04, 2022   0712 ATTENDING PROVIDER ATTESTATION:     I have personally performed and/or participated in the history, exam, medical decision making, and procedures and agree with all pertinent clinical information unless otherwise noted. I have also reviewed and agree with the past medical, family and social history unless otherwise noted. I have discussed this patient in detail with the resident and provided the instruction and education regarding the evidence-based evaluation and treatment of overdose. History: Patient reportedly snorted heroin today. She states she thought was cocaine. She became unresponsive. Her friend poured water on her without improvement. And then called EMS. EMS found her with a pulse but unresponsive. Poor respirations. They administered 4 mg of Narcan intranasally and followed that with 2 mg IV. She returned to awake, alert and oriented state. By arrival, no complaints. My findings: Clarita Palomares is a 40 y.o. female whom is in no distress. Physical exam reveals she is alert and oriented. Heart is regular, lungs are coarse. Abdomen is soft and nontender. Extremities are intact without edema. Skin is warm dry and wet (secondary to water having been poured on her). Good distal pulses and capillary refill. My plan: Symptomatic and supportive care. X-ray, EKG, observation. Electronically signed by April Love DO on 6/4/22 at 7:12 AM EDT       [TG]   8361 EKG: This EKG is signed and interpreted by me. Rate: 63  Rhythm: Sinus  Interpretation: no acute changes  Comparison: no previous   [TG]   1220 Notified at this time that the patient has started to become unresponsive again and mildly hypoxic. Oxygen applied. Patient evaluated.   She will awaken to painful stimuli but, immediately becomes unresponsive again. Additional Narcan ordered. Of note, patient's purse is next to her at this time. It was not there previously, and had been on the desk. Its unknown if she may have coming to contact with additional drugs or if simply the previously given Narcan had worn off. [TG]   0848 EKG: This EKG is signed by emergency department physician. Rate: 58  Rhythm: Sinus  Interpretation: No acute ST elevation or depression sinus rhythm normal axis  Comparison: stable as compared to patient's most recent EKG      [CB]   1035 Patient again found to be unresponsive. Narcan infusion ordered. [TG]   1038 Patient will give additional Narcan bolus as well as be started on Narcan infusion. [CB]   1211 Discussed admission with the patient. She refuses to be admitted. She just had a dose of Narcan and is currently alert and oriented. We discussed the risks of such decision. She is able to relate back understanding. She request to stay here in the emergency department for couple more hours. We will keep her here and continue observation. [TG]   1217 Called the patient's bedside. She is now willing to be admitted. She understands that she will have to stay until discharge. She agrees to do so. [TG]   1218 Patient is willing to be admitted at this time. [CB]   1229 Dr gaming agree with admission   [CB]      ED Course User Index  [CB] Howard Grover MD  [TG] April Love, DO       --------------------------------------------- PAST HISTORY ---------------------------------------------  Past Medical History:  has a past medical history of Diarrhea and RUQ discomfort. Past Surgical History:  has a past surgical history that includes LEEP (2007); Upper gastrointestinal endoscopy (N/A, 5/21/2019); esophageal motility study (N/A, 6/17/2019); and hiatal hernia repair (N/A, 8/1/2019). Social History:  reports that she has been smoking.  She has been smoking about 0.25 packs per day. She has never used smokeless tobacco. She reports current alcohol use. She reports current drug use. Drugs: Marijuana Elfida Wilson), Cocaine, and Opiates . Family History: family history is not on file. The patients home medications have been reviewed. Allergies: Patient has no known allergies.     -------------------------------------------------- RESULTS -------------------------------------------------    LABS:  Results for orders placed or performed during the hospital encounter of 06/04/22   CBC with Auto Differential   Result Value Ref Range    WBC 24.4 (H) 4.5 - 11.5 E9/L    RBC 4.45 3.50 - 5.50 E12/L    Hemoglobin 14.2 11.5 - 15.5 g/dL    Hematocrit 43.0 34.0 - 48.0 %    MCV 96.6 80.0 - 99.9 fL    MCH 31.9 26.0 - 35.0 pg    MCHC 33.0 32.0 - 34.5 %    RDW 12.7 11.5 - 15.0 fL    Platelets 172 841 - 886 E9/L    MPV 9.7 7.0 - 12.0 fL    Neutrophils % 84.7 (H) 43.0 - 80.0 %    Immature Granulocytes % 0.8 0.0 - 5.0 %    Lymphocytes % 7.1 (L) 20.0 - 42.0 %    Monocytes % 7.2 2.0 - 12.0 %    Eosinophils % 0.0 0.0 - 6.0 %    Basophils % 0.2 0.0 - 2.0 %    Neutrophils Absolute 20.67 (H) 1.80 - 7.30 E9/L    Immature Granulocytes # 0.20 E9/L    Lymphocytes Absolute 1.73 1.50 - 4.00 E9/L    Monocytes Absolute 1.75 (H) 0.10 - 0.95 E9/L    Eosinophils Absolute 0.01 (L) 0.05 - 0.50 E9/L    Basophils Absolute 0.06 0.00 - 0.20 E9/L    Poikilocytes 1+     Stomatocytes 1+    Basic Metabolic Panel   Result Value Ref Range    Sodium 135 132 - 146 mmol/L    Potassium 4.0 3.5 - 5.0 mmol/L    Chloride 97 (L) 98 - 107 mmol/L    CO2 23 22 - 29 mmol/L    Anion Gap 15 7 - 16 mmol/L    Glucose 116 (H) 74 - 99 mg/dL    BUN 8 6 - 20 mg/dL    CREATININE 0.8 0.5 - 1.0 mg/dL    GFR Non-African American >60 >=60 mL/min/1.73    GFR African American >60     Calcium 9.5 8.6 - 10.2 mg/dL   Troponin   Result Value Ref Range    Troponin, High Sensitivity 24 (H) 0 - 9 ng/L   Troponin   Result Value Ref Range    Troponin, High Sensitivity 22 (H) 0 - 9 ng/L   Urinalysis   Result Value Ref Range    Color, UA Yellow Straw/Yellow    Clarity, UA Clear Clear    Glucose, Ur 250 (A) Negative mg/dL    Bilirubin Urine Negative Negative    Ketones, Urine Negative Negative mg/dL    Specific Gravity, UA >=1.030 1.005 - 1.030    Blood, Urine TRACE-INTACT Negative    pH, UA 6.0 5.0 - 9.0    Protein,  (A) Negative mg/dL    Urobilinogen, Urine 0.2 <2.0 E.U./dL    Nitrite, Urine POSITIVE (A) Negative    Leukocyte Esterase, Urine TRACE (A) Negative   CK   Result Value Ref Range    Total  20 - 180 U/L   POC Pregnancy Urine Qual   Result Value Ref Range    HCG, Urine, POC Negative Negative    Lot Number [id number]     Positive QC Pass/Fail Pass     Negative QC Pass/Fail Pass    EKG 12 Lead   Result Value Ref Range    Ventricular Rate 63 BPM    Atrial Rate 63 BPM    P-R Interval 190 ms    QRS Duration 96 ms    Q-T Interval 440 ms    QTc Calculation (Bazett) 450 ms    P Axis 54 degrees    R Axis 29 degrees    T Axis 47 degrees   EKG 12 Lead   Result Value Ref Range    Ventricular Rate 58 BPM    Atrial Rate 58 BPM    P-R Interval 160 ms    QRS Duration 94 ms    Q-T Interval 436 ms    QTc Calculation (Bazett) 428 ms    P Axis 43 degrees    R Axis 36 degrees    T Axis 59 degrees       RADIOLOGY:  XR CHEST PORTABLE   Final Result   No acute process. CT Head WO Contrast    (Results Pending)   CT CHEST WO CONTRAST    (Results Pending)             ------------------------- NURSING NOTES AND VITALS REVIEWED ---------------------------  Date / Time Roomed:  6/4/2022  6:52 AM  ED Bed Assignment:  MAXIMILIAN/MAXIMILIAN    The nursing notes within the ED encounter and vital signs as below have been reviewed.      Patient Vitals for the past 24 hrs:   BP Temp Pulse Resp SpO2 Height Weight   06/04/22 1223 101/68  67 16 94 %     06/04/22 1055 (!) 138/93  82 14 99 %     06/04/22 0850 (!) 129/97  77 12 98 %     06/04/22 0710 138/85 97 °F (36.1 °C) 78 16 94 %     06/04/22 0704 124/78  78 16 (!) 89 % 5' 3\" (1.6 m) 200 lb (90.7 kg)       Oxygen Saturation Interpretation: on nasal canula    ------------------------------------------ PROGRESS NOTES ------------------------------------------  Re-evaluation(s):  Time: 1300  Patients symptoms show no change  Repeat physical examination is not changed    Counseling:  I have spoken with the patient and discussed todays results, in addition to providing specific details for the plan of care and counseling regarding the diagnosis and prognosis. Their questions are answered at this time and they are agreeable with the plan of admission.    --------------------------------- ADDITIONAL PROVIDER NOTES ---------------------------------  Consultations:  . Spoke with Dr. Nic Martinez. Discussed case. They will admit the patient. This patient's ED course included: a personal history and physicial examination, re-evaluation prior to disposition, multiple bedside re-evaluations, IV medications, cardiac monitoring and continuous pulse oximetry    This patient has remained hemodynamically stable during their ED course. Diagnosis:  1. Opiate overdose, accidental or unintentional, initial encounter ([facility] [address]    2. Drug abuse ([facility])    3. Leukocytosis, unspecified type        Disposition:  Patient's disposition: Admit to telemetry  Patient's condition is stable.          Davin Lacey MD  Resident  06/04/22 4424

## 2022-06-04 NOTE — ED NOTES
Patient became hard to arouse once again, Dr. Mira Ugalde called to bedside, new orders obtained, medications given, patient awake and alert at this time, will continue to monitor.       Jackson Johnson RN  06/04/22 1961

## 2022-06-04 NOTE — ED NOTES
Patient resting with eyes closed, arouses to verbal stimuli, respirations easy and non labored, will continue to monitor.       Apolonia Ward RN  06/04/22 2249

## 2022-06-04 NOTE — PROGRESS NOTES
Patient admitted from 430 to 207, with the following belongings - cellphone with , glasses, shorts, shoes and purse. Patient placed on monitor, patient oriented to room and unit visiting hours. Patient guide at bedside, reviewed patient rights and responsibilities. MRSA nasal swab obtained. Bed alarm on. Call light within reach.

## 2022-06-04 NOTE — ED NOTES
Patient hard to arouse with sternal rub, physician notified, new order obtained. Medication given per order, patient awake and alert at this time, denies complaints, will continue to monitor.       Monse Carrington RN  06/04/22 4357

## 2022-06-05 PROBLEM — J96.01 ACUTE RESPIRATORY FAILURE WITH HYPOXIA AND HYPERCAPNIA (HCC): Status: ACTIVE | Noted: 2022-06-05

## 2022-06-05 PROBLEM — Z72.0 TOBACCO ABUSE: Status: ACTIVE | Noted: 2022-06-05

## 2022-06-05 PROBLEM — J96.02 ACUTE RESPIRATORY FAILURE WITH HYPOXIA AND HYPERCAPNIA (HCC): Status: ACTIVE | Noted: 2022-06-05

## 2022-06-05 LAB
ALBUMIN SERPL-MCNC: 4 G/DL (ref 3.5–5.2)
ALP BLD-CCNC: 74 U/L (ref 35–104)
ALT SERPL-CCNC: 15 U/L (ref 0–32)
AMPHETAMINE SCREEN, URINE: NOT DETECTED
ANION GAP SERPL CALCULATED.3IONS-SCNC: 10 MMOL/L (ref 7–16)
AST SERPL-CCNC: 17 U/L (ref 0–31)
BARBITURATE SCREEN URINE: NOT DETECTED
BASOPHILS ABSOLUTE: 0.02 E9/L (ref 0–0.2)
BASOPHILS RELATIVE PERCENT: 0.2 % (ref 0–2)
BENZODIAZEPINE SCREEN, URINE: NOT DETECTED
BILIRUB SERPL-MCNC: 0.2 MG/DL (ref 0–1.2)
BUN BLDV-MCNC: 6 MG/DL (ref 6–20)
CALCIUM SERPL-MCNC: 8.8 MG/DL (ref 8.6–10.2)
CANNABINOID SCREEN URINE: POSITIVE
CHLORIDE BLD-SCNC: 101 MMOL/L (ref 98–107)
CO2: 27 MMOL/L (ref 22–29)
COCAINE METABOLITE SCREEN URINE: POSITIVE
CREAT SERPL-MCNC: 0.7 MG/DL (ref 0.5–1)
EKG ATRIAL RATE: 58 BPM
EKG ATRIAL RATE: 63 BPM
EKG P AXIS: 43 DEGREES
EKG P AXIS: 54 DEGREES
EKG P-R INTERVAL: 160 MS
EKG P-R INTERVAL: 190 MS
EKG Q-T INTERVAL: 436 MS
EKG Q-T INTERVAL: 440 MS
EKG QRS DURATION: 94 MS
EKG QRS DURATION: 96 MS
EKG QTC CALCULATION (BAZETT): 428 MS
EKG QTC CALCULATION (BAZETT): 450 MS
EKG R AXIS: 29 DEGREES
EKG R AXIS: 36 DEGREES
EKG T AXIS: 47 DEGREES
EKG T AXIS: 59 DEGREES
EKG VENTRICULAR RATE: 58 BPM
EKG VENTRICULAR RATE: 63 BPM
EOSINOPHILS ABSOLUTE: 0.09 E9/L (ref 0.05–0.5)
EOSINOPHILS RELATIVE PERCENT: 0.7 % (ref 0–6)
FENTANYL SCREEN, URINE: POSITIVE
GFR AFRICAN AMERICAN: >60
GFR NON-AFRICAN AMERICAN: >60 ML/MIN/1.73
GLUCOSE BLD-MCNC: 112 MG/DL (ref 74–99)
HCT VFR BLD CALC: 37.4 % (ref 34–48)
HEMOGLOBIN: 12.4 G/DL (ref 11.5–15.5)
IMMATURE GRANULOCYTES #: 0.05 E9/L
IMMATURE GRANULOCYTES %: 0.4 % (ref 0–5)
LYMPHOCYTES ABSOLUTE: 2.6 E9/L (ref 1.5–4)
LYMPHOCYTES RELATIVE PERCENT: 20.3 % (ref 20–42)
Lab: ABNORMAL
MCH RBC QN AUTO: 32.2 PG (ref 26–35)
MCHC RBC AUTO-ENTMCNC: 33.2 % (ref 32–34.5)
MCV RBC AUTO: 97.1 FL (ref 80–99.9)
METHADONE SCREEN, URINE: NOT DETECTED
MONOCYTES ABSOLUTE: 1.01 E9/L (ref 0.1–0.95)
MONOCYTES RELATIVE PERCENT: 7.9 % (ref 2–12)
NEUTROPHILS ABSOLUTE: 9.05 E9/L (ref 1.8–7.3)
NEUTROPHILS RELATIVE PERCENT: 70.5 % (ref 43–80)
OPIATE SCREEN URINE: NOT DETECTED
OXYCODONE URINE: NOT DETECTED
PDW BLD-RTO: 13 FL (ref 11.5–15)
PHENCYCLIDINE SCREEN URINE: NOT DETECTED
PLATELET # BLD: 329 E9/L (ref 130–450)
PMV BLD AUTO: 9.7 FL (ref 7–12)
POTASSIUM REFLEX MAGNESIUM: 4.5 MMOL/L (ref 3.5–5)
RBC # BLD: 3.85 E12/L (ref 3.5–5.5)
SODIUM BLD-SCNC: 138 MMOL/L (ref 132–146)
TOTAL PROTEIN: 6.6 G/DL (ref 6.4–8.3)
WBC # BLD: 12.8 E9/L (ref 4.5–11.5)

## 2022-06-05 PROCEDURE — 94660 CPAP INITIATION&MGMT: CPT

## 2022-06-05 PROCEDURE — 2580000003 HC RX 258: Performed by: FAMILY MEDICINE

## 2022-06-05 PROCEDURE — 6360000002 HC RX W HCPCS: Performed by: EMERGENCY MEDICINE

## 2022-06-05 PROCEDURE — 85025 COMPLETE CBC W/AUTO DIFF WBC: CPT

## 2022-06-05 PROCEDURE — 6370000000 HC RX 637 (ALT 250 FOR IP): Performed by: FAMILY MEDICINE

## 2022-06-05 PROCEDURE — 80053 COMPREHEN METABOLIC PANEL: CPT

## 2022-06-05 PROCEDURE — 2580000003 HC RX 258: Performed by: EMERGENCY MEDICINE

## 2022-06-05 PROCEDURE — 2580000003 HC RX 258: Performed by: INTERNAL MEDICINE

## 2022-06-05 PROCEDURE — 99232 SBSQ HOSP IP/OBS MODERATE 35: CPT | Performed by: FAMILY MEDICINE

## 2022-06-05 PROCEDURE — 2700000000 HC OXYGEN THERAPY PER DAY

## 2022-06-05 PROCEDURE — 6370000000 HC RX 637 (ALT 250 FOR IP): Performed by: INTERNAL MEDICINE

## 2022-06-05 PROCEDURE — 36415 COLL VENOUS BLD VENIPUNCTURE: CPT

## 2022-06-05 PROCEDURE — 6360000002 HC RX W HCPCS: Performed by: INTERNAL MEDICINE

## 2022-06-05 PROCEDURE — 2000000000 HC ICU R&B

## 2022-06-05 RX ORDER — IBUPROFEN 800 MG/1
800 TABLET ORAL EVERY 8 HOURS PRN
Status: DISCONTINUED | OUTPATIENT
Start: 2022-06-05 | End: 2022-06-07 | Stop reason: HOSPADM

## 2022-06-05 RX ORDER — ALBUTEROL SULFATE 90 UG/1
2 AEROSOL, METERED RESPIRATORY (INHALATION) EVERY 6 HOURS PRN
Status: DISCONTINUED | OUTPATIENT
Start: 2022-06-05 | End: 2022-06-05 | Stop reason: CLARIF

## 2022-06-05 RX ORDER — CLONIDINE HYDROCHLORIDE 0.1 MG/1
0.1 TABLET ORAL EVERY 6 HOURS PRN
Status: DISCONTINUED | OUTPATIENT
Start: 2022-06-05 | End: 2022-06-07 | Stop reason: HOSPADM

## 2022-06-05 RX ORDER — PREDNISONE 20 MG/1
40 TABLET ORAL
Status: DISCONTINUED | OUTPATIENT
Start: 2022-06-05 | End: 2022-06-07 | Stop reason: HOSPADM

## 2022-06-05 RX ORDER — NALOXONE HYDROCHLORIDE 0.4 MG/ML
0.4 INJECTION, SOLUTION INTRAMUSCULAR; INTRAVENOUS; SUBCUTANEOUS ONCE
Status: COMPLETED | OUTPATIENT
Start: 2022-06-05 | End: 2022-06-05

## 2022-06-05 RX ORDER — TRAMADOL HYDROCHLORIDE 50 MG/1
50 TABLET ORAL PRN
Status: DISCONTINUED | OUTPATIENT
Start: 2022-06-05 | End: 2022-06-07 | Stop reason: HOSPADM

## 2022-06-05 RX ORDER — ALBUTEROL SULFATE 2.5 MG/3ML
2.5 SOLUTION RESPIRATORY (INHALATION) EVERY 6 HOURS PRN
Status: DISCONTINUED | OUTPATIENT
Start: 2022-06-05 | End: 2022-06-05 | Stop reason: SDUPTHER

## 2022-06-05 RX ORDER — ALBUTEROL SULFATE 90 UG/1
2 AEROSOL, METERED RESPIRATORY (INHALATION) EVERY 6 HOURS PRN
Status: DISCONTINUED | OUTPATIENT
Start: 2022-06-05 | End: 2022-06-07 | Stop reason: HOSPADM

## 2022-06-05 RX ADMIN — SODIUM CHLORIDE, PRESERVATIVE FREE 10 ML: 5 INJECTION INTRAVENOUS at 12:37

## 2022-06-05 RX ADMIN — IBUPROFEN 800 MG: 800 TABLET, FILM COATED ORAL at 14:34

## 2022-06-05 RX ADMIN — ALBUTEROL SULFATE 2 PUFF: 90 AEROSOL, METERED RESPIRATORY (INHALATION) at 14:28

## 2022-06-05 RX ADMIN — SALINE NASAL SPRAY 2 SPRAY: 1.5 SOLUTION NASAL at 14:47

## 2022-06-05 RX ADMIN — ACETAMINOPHEN 650 MG: 325 TABLET ORAL at 01:53

## 2022-06-05 RX ADMIN — NALOXONE HYDROCHLORIDE 0.4 MG: 0.4 INJECTION, SOLUTION INTRAMUSCULAR; INTRAVENOUS; SUBCUTANEOUS at 12:36

## 2022-06-05 RX ADMIN — NALOXONE HYDROCHLORIDE: 0.4 INJECTION, SOLUTION INTRAMUSCULAR; INTRAVENOUS; SUBCUTANEOUS at 18:43

## 2022-06-05 RX ADMIN — NALOXONE HYDROCHLORIDE 0.4 MG/HR: 1 INJECTION PARENTERAL at 03:21

## 2022-06-05 RX ADMIN — SODIUM CHLORIDE, PRESERVATIVE FREE 10 ML: 5 INJECTION INTRAVENOUS at 20:49

## 2022-06-05 RX ADMIN — PREDNISONE 40 MG: 20 TABLET ORAL at 14:28

## 2022-06-05 RX ADMIN — NALOXONE HYDROCHLORIDE 0.4 MG/HR: 1 INJECTION PARENTERAL at 12:51

## 2022-06-05 ASSESSMENT — PAIN SCALES - GENERAL
PAINLEVEL_OUTOF10: 0
PAINLEVEL_OUTOF10: 4
PAINLEVEL_OUTOF10: 0

## 2022-06-05 ASSESSMENT — PAIN DESCRIPTION - ORIENTATION: ORIENTATION: MID

## 2022-06-05 ASSESSMENT — PAIN DESCRIPTION - DESCRIPTORS: DESCRIPTORS: TENDER

## 2022-06-05 ASSESSMENT — PAIN DESCRIPTION - LOCATION: LOCATION: CHEST

## 2022-06-05 NOTE — CONSULTS
Critical Care Admit/Consult Note         Patient - Van Lopez   MRN -  [id number]   [facility]   - 1985      Date of Admission -  2022  6:52 AM  Date of evaluation -  2022/020-A   Hospital Day - 1            ADMIT/CONSULT DETAILS     Reason for Admit/Consult   Opioid Overdose    Consulting [facility]  Primary Care Physician - No primary care provider on file. HPI   The patient is a 40 y.o. female with significant past medical history of drug use who presented to the ED yesterday after being found unresponsive and was given 4 mg Narcan by EMS which she did respond to. Patient admits to snorting what she thought was cocaine at the time however states she believes it was fentanyl now. Patient only complains of some mild chest wall pain from where she states she was sternal rubbed yesterday. She also states she has had a cough for the past week, with productive clear sputum. She is a daily smoker, admits to half pack per day. Her complaint is constant, moderate in severity, chest pain worse on touching, no relieving factors. Patient alert and oriented. Patient denies any issues overnight. Patient on 6 L nasal cannula. Narcan drip turned off this morning,   Lab work this morning remarkable for mild leukocytosis 12.8, otherwise unremarkable. Mild tenderness to the anterior chest on palpation, no outward signs of trauma.            Past Medical History         Diagnosis Date    Diarrhea     RUQ discomfort         Past Surgical History           Procedure Laterality Date    ESOPHAGEAL MOTILITY STUDY N/A 2019    ESOPHAGEAL MOTILITY/MANOMETRY STUDY performed by Chelsie Capps DO at [address] N/A 2019    LAPAROSCOPIC, ROBOTIC XI ASSISTED PARAESOPHAGEAL HERNIA REPAIR performed by Boni Ohara MD at [address]      UPPER GASTROINTESTINAL ENDOSCOPY N/A 2019    EGD BIOPSY performed by Brigitte Cochran MD at [facility] ENDOSCOPY       Current Medications   Current Medications    naloxone  0.4 mg IntraVENous Once    nicotine  1 patch TransDERmal Daily    lactobacillus  1 capsule Oral Daily    sodium chloride flush  5-40 mL IntraVENous 2 times per day    enoxaparin  40 mg SubCUTAneous Daily     albuterol sulfate HFA, diphenhydrAMINE, sodium chloride flush, sodium chloride, ondansetron **OR** ondansetron, polyethylene glycol, acetaminophen **OR** acetaminophen  IV Drips/Infusions   sodium chloride       Home Medications  Medications Prior to Admission: ibuprofen (ADVIL;MOTRIN) 200 MG tablet, Take 200 mg by mouth every 6 hours as needed for Pain (Patient not taking: Reported on 6/4/2022)  ibuprofen (ADVIL;MOTRIN) 800 MG tablet, Take 1 tablet by mouth every 6 hours as needed for Pain  amitriptyline (ELAVIL) 10 MG tablet, Take 10 mg by mouth nightly (Patient not taking: Reported on 6/4/2022)  sucralfate (CARAFATE) 1 GM/10ML suspension, Take 10 mLs by mouth 4 times daily (Patient not taking: Reported on 12/2/2020)  Probiotic Product (ALIGN) 4 MG CAPS, as needed Last dose 5-17-19 (Patient not taking: Reported on 6/4/2022)  dicyclomine (BENTYL) 10 MG capsule, Take 10 mg by mouth as needed  (Patient not taking: Reported on 6/4/2022)    Diet/Nutrition   ADULT DIET; Regular    Allergies   Patient has no known allergies. Social History   Tobacco   reports that she has been smoking. She has been smoking about 0.25 packs per day. She has never used smokeless tobacco.    Alcohol     reports current alcohol use. Occupational history :    Family History   History reviewed. No pertinent family history.     Sleep History   No complaints    ROS     REVIEW OF SYSTEMS:  CONSTITUTIONAL:  negative  EYES:  negative  HEENT:  negative  RESPIRATORY:  positive for  cough with sputum  CARDIOVASCULAR:  positive for  Chest wall pain  GASTROINTESTINAL:  negative  GENITOURINARY:  negative  INTEGUMENT/BREAST: negative  HEMATOLOGIC/LYMPHATIC:  negative  ENDOCRINE:  negative  MUSCULOSKELETAL:  negative  NEUROLOGICAL:  negative    Lines and Devices        Mechanical Ventilation Data   VENT SETTINGS (Comprehensive)     Additional Respiratory Assessments  Heart Rate: 79  Resp: 21  SpO2: 96 %    ABG  Lab Results   Component Value Date    PH 7.322 2022    PCO2 51.4 2022    PO2 85.8 2022    HCO3 26.0 2022    O2SAT 96.1 2022     Lab Results   Component Value Date    MODE NC- 3 L 2022           Vitals    height is 5' 3\" (1.6 m) and weight is 190 lb 11.2 oz (86.5 kg). Her axillary temperature is 98 °F (36.7 °C). Her blood pressure is 112/65 and her pulse is 79. Her respiration is 21 and oxygen saturation is 96%. Temperature Range: Temp: 98 °F (36.7 °C) Temp  Av.8 °F (36.6 °C)  Min: 97.4 °F (36.3 °C)  Max: 98.1 °F (36.7 °C)  BP Range:  Systolic (89YVH), HNP:035 , Min:100 , CGE:551     Diastolic (86LHR), GJV:41, Min:54, Max:92    Pulse Range: Pulse  Av.4  Min: 49  Max: 79  Respiration Range: Resp  Av.4  Min: 11  Max: 25  Current Pulse Ox[de-identified]  SpO2: 96 %  24HR Pulse Ox Range:  SpO2  Av.4 %  Min: 92 %  Max: 100 %  Oxygen Amount and Delivery: O2 Flow Rate (L/min): (S) 1 L/min      I/O (24 Hours)    Patient Vitals for the past 8 hrs:   BP Temp Temp src Pulse Resp SpO2 Weight   22 0836    79 21 96 %    22 0800    74 11 98 %    22 0700 112/65   59 20 95 %    22 0600 112/65 98 °F (36.7 °C) Axillary 58 18 93 %    22 0530 112/65   (!) 49 25 97 %    22 0500    68 14 94 % 190 lb 11.2 oz (86.5 kg)   22 0400  97.8 °F (36.6 °C) Axillary 69 18 92 %        Intake/Output Summary (Last 24 hours) at 2022 1133  Last data filed at 2022 0500  Gross per 24 hour   Intake 1585 ml   Output 300 ml   Net 1285 ml     I/O last 3 completed shifts:   In: 4075 [P.O.:10; I.V.:1575]  Out: 300 [Urine:300]   Date 22 0000 - 22 6185 Shift 0092-7594 6537-9953 3712-2359 24 Hour Total   INTAKE   I.V.(mL/kg) 8057(42.5)   3177(33.4)   Shift Total(mL/kg) 4098(18.1)   3658(01.8)   OUTPUT   Urine(mL/kg/hr) 300(0.4)   300   Shift Total(mL/kg) 300(3.5)   300(3.5)   Weight (kg) 86.5 86.5 86.5 86.5     Patient Vitals for the past 96 hrs (Last 3 readings):   Weight   06/05/22 0500 190 lb 11.2 oz (86.5 kg)   06/04/22 0704 200 lb (90.7 kg)         Drains/Tubes Outputs    Exam     PHYSICAL EXAM:  General appearance - alert, well appearing, and in no distress  Mental status - alert, oriented to person, place, and time  Eyes - pupils equal and reactive, extraocular eye movements intact  Ears - bilateral TM's and external ear canals normal  Nose - normal and patent, no erythema, discharge or polyps  Mouth - mucous membranes moist, pharynx normal without lesions  Neck - supple, no significant adenopathy  Chest - clear to auscultation, no wheezes, rales or rhonchi, symmetric air entry, Mild tenderness anterior chest on palpation (reproduces CP), + cough  Heart - normal rate, regular rhythm, normal S1, S2, no murmurs, rubs, clicks or gallops  Abdomen - soft, nontender, nondistended, no masses or organomegaly  Neurological - alert, oriented, normal speech, no focal findings or movement disorder noted  Extremities - peripheral pulses normal, no pedal edema, no clubbing or cyanosis  Skin - normal coloration and turgor, no rashes, no suspicious skin lesions noted     Data   Old records and images have been reviewed    Lab Results   CBC     Lab Results   Component Value Date    WBC 12.8 06/05/2022    RBC 3.85 06/05/2022    HGB 12.4 06/05/2022    HCT 37.4 06/05/2022     06/05/2022    MCV 97.1 06/05/2022    MCH 32.2 06/05/2022    MCHC 33.2 06/05/2022    RDW 13.0 06/05/2022    LYMPHOPCT 20.3 06/05/2022    MONOPCT 7.9 06/05/2022    BASOPCT 0.2 06/05/2022    MONOSABS 1.01 06/05/2022    LYMPHSABS 2.60 06/05/2022    EOSABS 0.09 06/05/2022    BASOSABS 0.02 06/05/2022 BMP   Lab Results   Component Value Date     06/05/2022    K 4.5 06/05/2022     06/05/2022    CO2 27 06/05/2022    BUN 6 06/05/2022    CREATININE 0.7 06/05/2022    GLUCOSE 112 06/05/2022    CALCIUM 8.8 06/05/2022       LFTS  Lab Results   Component Value Date    ALKPHOS 74 06/05/2022    ALT 15 06/05/2022    AST 17 06/05/2022    PROT 6.6 06/05/2022    BILITOT 0.2 06/05/2022    LABALBU 4.0 06/05/2022       INR  No results for input(s): PROTIME, INR in the last 72 hours. APTT  No results for input(s): APTT in the last 72 hours. Lactic Acid  No results found for: LACTA     BNP   No results for input(s): BNP in the last 72 hours. Cultures     No results for input(s): BC in the last 72 hours. No results for input(s): Maddi Alpers in the last 72 hours. No results for input(s): LABURIN in the last 72 hours. Radiology   CXR 6/4/22  No acute process.        CT Scans   CT Chest 6/4/22  1.  Diffuse ground-glass opacities throughout both lungs, with more   prominence dependently within the lower lobes.  This could be secondary to   partial filling of airspaces/partial collapse of alveoli, however, true lung   pathology, including infectious etiologies, acute alveolar disease, chronic   interstitial lung disease, as well as drug induced lung disease/drug toxicity   cannot be excluded.  Increased ground-glass opacification at the lung bases   could also be secondary to dependent atelectasis.  Clinical correlation is   recommended.       2.  Multiple blebs in the periphery of the upper lobes and superior segments   of the lower lobes.  Rule out early paraseptal emphysema.       3.  Probable old right 11th rib fracture.  Clinical correlation is   recommended.         CT Head 6/4/22  1.  Tiny focus of slightly increased density in the right-side of the devonte   and best appreciated on 1 axial slice, as described above.  This is probably   artifactual.  The presence of a small lesion, including cavernous venous   malformation, cannot be excluded.  MRI of the brain with and without   intravenous gadolinium can be performed for further evaluation.       2.  Mild ethmoid and minimal left maxillary chronic sinusitis. SYSTEMS ASSESSMENT    Neuro   Unresponsive yesterday secondary to Opioid Overdose  Received multiple doses narcan including Narcan drip  A&O x 3 today  Hold Narcan and observe  Rass 0 - Alert, Calm    Respiratory   Acute Respiratory Acidosis (pH 7.32, pCO2 51.4) - improved  Pt on 6L NC - Wean oxygen as tolerated. Keep O2 sat 90-92%  Albuterol PRN for cough   AVAP  Respiratory Therapy    Cardiovascular   No prev cardiac hx  Pt normotensive, no pressors. Chest wall tenderness - Tylenol PRN  Monitor vitals    Gastrointestinal   Normal diet  Zofran PRN    Renal   Normal kidney fx     Infectious Disease   + Cough   Pt is chronic smoker  No PNA on CXR    Hematology/Oncology   DVT prophylaxis - Lovenox    Endocrine   Respiratory Acidosis (pH <7.35, increased PaCO2)  Electrolytes balanced      Social/Spiritual/DNR/Other   Smoking cessation counseling  Drug use counseling (UDS + cocaine, fentanyl, marijuana)      Episodes of unresponsiveness secondary to Opioid use yesterday  Narcan drip stopped   Nicoderm patch   Plan to observe pt now that narcan was stopped. Plan for d/c tomorrow     Ervin Griffin DO  PGY -1    6/5/2022  11:33 AM     I personally saw, examined and provided care for the patient. Radiographs, labs and medication list were reviewed by me independently. I spoke with bedside nursing, therapists and consultants. Critical care services and times documented are independent of procedures and multidisciplinary rounds with Residents. Additionally comprehensive, multidisciplinary rounds were conducted with the MICU team. The case was discussed in detail and plans for care were established. Review of Residents documentation was conducted and revisions were made as appropriate.  I agree with the above documented exam, problem list and plan of care with the following additions:    Ms. Christian Dumont is a 40year old female admitted to the ICU after an opioid overdose on a narcan infusion  She was hypercapnic requiring NIV, however, today she is more awake and alert off of narcan  We were planning to monitor her for possible discharge, however, when she fell asleep her oxygen saturation dipped to 20% necessitating NIV once again. She was arousable thereafter. Certainly needs a sleep study  Albuterol prn  Prednisone 40mg x 5 days for pneumonitis  I feel she needs ongoing observation    33 minutes of CCT spent with the patient, reviewing the chart including imaging studies, and discussing the case with other health care professionals. This time excludes procedures. Megan Anders MD       Addendum:  Patient becoming consistently drowsy with EtCO2 >65 mmH20 - she responded immediately again to narcan and we are going to resume the infusion. Her EtCO2 responded to 42 mmH20. Will need to monitor her closely to ensure no ongoing in hospital drug use. requires ICU level care.     Megan Anders MD

## 2022-06-05 NOTE — PATIENT CARE CONFERENCE
Intensive Care Daily Quality Rounding Checklist      ICU Team Members: Dr. Mai Bee; Resident; Charge Nurse; Bedside Nurse      ICU Day #: 2    Intubation Date:     Ventilator Day #:     Central Line Insertion Date:         Day #:      Arterial Line Insertion Date:    Day #:     Temporary Hemodialysis Catheter Insertion Date:       Day #     DVT Prophylaxis: Lovenox    GI Prophylaxis: PO diet    Green Catheter Insertion Date:        Day #:       Continued need (if yes, reason documented and discussed with physician):     Skin Issues/ Wounds and ordered treatment discussed on rounds:No    Goals/ Plans for the Day: Daily labs and replace/transfuse as needed. Wean narcan. Transfer to F. Possible discharge.

## 2022-06-05 NOTE — PROGRESS NOTES
[facility] Progress Note    Admitting Date and Time: 6/4/2022  6:52 AM  Admit Dx: [facility] [F19.10]  Opiate overdose, accidental or unintentional, initial encounter [facility] [T40.601A]  Drug overdose, accidental or unintentional, initial encounter [T50.901A]  Leukocytosis, unspecified type [D72.829]    Subjective:  Patient is being followed for Drug abuse ([facility] [F19.10]  Opiate overdose, accidental or unintentional, initial encounter ([facility] [T40.601A]  Drug overdose, accidental or unintentional, initial encounter [T50.901A]  Leukocytosis, unspecified type [D72.829]     Pt feels OK, feels better c/w yesterday. O2 sat down to 20% on RA while lying in bed and off Narcan. Per RN: tried to ambulate her earlier for possible discharge but was too hypoxic    ROS: denies fever, chills, cp, sob, n/v, HA unless stated above.       predniSONE  40 mg Oral Daily with breakfast    nicotine  1 patch TransDERmal Daily    lactobacillus  1 capsule Oral Daily    sodium chloride flush  5-40 mL IntraVENous 2 times per day    enoxaparin  40 mg SubCUTAneous Daily     albuterol sulfate HFA, 2 puff, Q6H PRN  traMADol, 50 mg, PRN  cloNIDine, 0.1 mg, Q6H PRN  ibuprofen, 800 mg, Q8H PRN  sodium chloride, 2 spray, Q2H PRN  diphenhydrAMINE, 25 mg, Q6H PRN  sodium chloride flush, 5-40 mL, PRN  sodium chloride, , PRN  ondansetron, 4 mg, Q8H PRN   Or  ondansetron, 4 mg, Q6H PRN  polyethylene glycol, 17 g, Daily PRN  acetaminophen, 650 mg, Q6H PRN   Or  acetaminophen, 650 mg, Q6H PRN         Objective:    BP (!) 115/59   Pulse 66   Temp 98.9 °F (37.2 °C) (Oral)   Resp 27   Ht 5' 3\" (1.6 m)   Wt 190 lb 11.2 oz (86.5 kg)   LMP 05/28/2022   SpO2 93%   BMI 33.78 kg/m²     General Appearance: somnolent, easily arousable, and oriented to person, place and time and in no acute distress  Skin: warm and dry, good turgor, no rashes, no jaundice  Head: normocephalic and atraumatic  Eyes: pupils equal, round, and reactive to light, extraocular eye movements intact, conjunctivae normal  Neck: neck supple and non tender without mass   Pulmonary/Chest: clear to auscultation bilaterally- no wheezes, rales or rhonchi, normal air movement, no respiratory distress  Cardiovascular: normal rate, normal S1 and S2 and no carotid bruits  Abdomen: soft, non-tender, non-distended, normal bowel sounds, no masses or organomegaly  Extremities: no cyanosis, no clubbing and no edema  Neurologic: no cranial nerve deficit and speech normal        Recent Labs     06/04/22  1054 06/05/22  0501    138   K 4.0 4.5   CL 97* 101   CO2 23 27   BUN 8 6   CREATININE 0.8 0.7   GLUCOSE 116* 112*   CALCIUM 9.5 8.8       Recent Labs     06/04/22  1054 06/05/22  0501   WBC 24.4* 12.8*   RBC 4.45 3.85   HGB 14.2 12.4   HCT 43.0 37.4   MCV 96.6 97.1   MCH 31.9 32.2   MCHC 33.0 33.2   RDW 12.7 13.0    329   MPV 9.7 9.7       Radiology:   None new    Assessment:    Principal Problem:    Drug overdose, accidental or unintentional, initial encounter  Resolved Problems:    * No resolved hospital problems. *      Plan:  1. Drug overdose -- opiate, fentanyl. Consider could be methadone as her respiratory failure returns once Narcan wears off, still desaturating today on hospital day #2.  -Continue watch in ICU  -Critical care/pulmonology ongoing  -Narcan drip    2. Tobacco abuse -- Nicoderm patch    3.  Acute respiratory failure, hypoxic and hypercapnic  -As above  -Possibly has BRENNEN -- outpatient sleep study per Dr. Mckeon Mahesh: to home without services  Will offer rehab inpatient stay      NOTE: This report was transcribed using voice recognition software. Every effort was made to ensure accuracy; however, inadvertent computerized transcription errors may be present.   Electronically signed by Aleah Nesbitt DO on 6/5/2022 at 7:07 PM

## 2022-06-05 NOTE — PROGRESS NOTES
06/05/22 0030   NIV Type   $NIV $Daily Charge   Skin Protection for O2 Device Yes  (applied  pt keeps pulling at mask)   Mode AVAPS   Mask Type Full face mask   Mask Size Small   Settings/Measurements   CPAP/EPAP 8 cmH2O   Vt (Set, mL) 450 mL  (for pt comfort/compliance with unit)   Rate Ordered 18   Resp 19   FiO2  40 %   Vt Exhaled 520 mL   Mask Leak (lpm) 40 lpm   Comfort Level Good   Using Accessory Muscles No   SpO2 97   Patient Observation   Observations red outlet   Alarm Settings   Alarms On Y   Low Pressure   (in wall)

## 2022-06-06 PROBLEM — J96.02 ACUTE RESPIRATORY ACIDOSIS (HCC): Status: ACTIVE | Noted: 2022-06-06

## 2022-06-06 PROBLEM — J98.4 PNEUMONITIS: Status: ACTIVE | Noted: 2022-06-06

## 2022-06-06 PROBLEM — F12.10 CANNABIS ABUSE: Status: ACTIVE | Noted: 2022-06-06

## 2022-06-06 PROBLEM — T40.601A OVERDOSE OPIATE, ACCIDENTAL OR UNINTENTIONAL, INITIAL ENCOUNTER (HCC): Status: ACTIVE | Noted: 2022-06-06

## 2022-06-06 PROBLEM — F14.10 COCAINE ABUSE (HCC): Status: ACTIVE | Noted: 2022-06-06

## 2022-06-06 PROBLEM — J18.9 PNEUMONITIS: Status: ACTIVE | Noted: 2022-06-06

## 2022-06-06 LAB
ALBUMIN SERPL-MCNC: 3.4 G/DL (ref 3.5–5.2)
ALP BLD-CCNC: 56 U/L (ref 35–104)
ALT SERPL-CCNC: 12 U/L (ref 0–32)
ANION GAP SERPL CALCULATED.3IONS-SCNC: 9 MMOL/L (ref 7–16)
AST SERPL-CCNC: 12 U/L (ref 0–31)
BASOPHILS ABSOLUTE: 0.01 E9/L (ref 0–0.2)
BASOPHILS RELATIVE PERCENT: 0.1 % (ref 0–2)
BILIRUB SERPL-MCNC: <0.2 MG/DL (ref 0–1.2)
BUN BLDV-MCNC: 7 MG/DL (ref 6–20)
CALCIUM SERPL-MCNC: 8.2 MG/DL (ref 8.6–10.2)
CHLORIDE BLD-SCNC: 105 MMOL/L (ref 98–107)
CO2: 25 MMOL/L (ref 22–29)
CREAT SERPL-MCNC: 0.7 MG/DL (ref 0.5–1)
EOSINOPHILS ABSOLUTE: 0.01 E9/L (ref 0.05–0.5)
EOSINOPHILS RELATIVE PERCENT: 0.1 % (ref 0–6)
GFR AFRICAN AMERICAN: >60
GFR NON-AFRICAN AMERICAN: >60 ML/MIN/1.73
GLUCOSE BLD-MCNC: 123 MG/DL (ref 74–99)
HCT VFR BLD CALC: 32.3 % (ref 34–48)
HEMOGLOBIN: 10.5 G/DL (ref 11.5–15.5)
IMMATURE GRANULOCYTES #: 0.03 E9/L
IMMATURE GRANULOCYTES %: 0.3 % (ref 0–5)
LYMPHOCYTES ABSOLUTE: 1.88 E9/L (ref 1.5–4)
LYMPHOCYTES RELATIVE PERCENT: 20.9 % (ref 20–42)
MCH RBC QN AUTO: 32.2 PG (ref 26–35)
MCHC RBC AUTO-ENTMCNC: 32.5 % (ref 32–34.5)
MCV RBC AUTO: 99.1 FL (ref 80–99.9)
MONOCYTES ABSOLUTE: 0.77 E9/L (ref 0.1–0.95)
MONOCYTES RELATIVE PERCENT: 8.6 % (ref 2–12)
NEUTROPHILS ABSOLUTE: 6.3 E9/L (ref 1.8–7.3)
NEUTROPHILS RELATIVE PERCENT: 70 % (ref 43–80)
PDW BLD-RTO: 12.5 FL (ref 11.5–15)
PLATELET # BLD: 238 E9/L (ref 130–450)
PMV BLD AUTO: 10.2 FL (ref 7–12)
POTASSIUM REFLEX MAGNESIUM: 4 MMOL/L (ref 3.5–5)
RBC # BLD: 3.26 E12/L (ref 3.5–5.5)
SODIUM BLD-SCNC: 139 MMOL/L (ref 132–146)
TOTAL PROTEIN: 5.6 G/DL (ref 6.4–8.3)
WBC # BLD: 9 E9/L (ref 4.5–11.5)

## 2022-06-06 PROCEDURE — 99232 SBSQ HOSP IP/OBS MODERATE 35: CPT | Performed by: FAMILY MEDICINE

## 2022-06-06 PROCEDURE — 85025 COMPLETE CBC W/AUTO DIFF WBC: CPT

## 2022-06-06 PROCEDURE — 94660 CPAP INITIATION&MGMT: CPT

## 2022-06-06 PROCEDURE — 6360000002 HC RX W HCPCS: Performed by: INTERNAL MEDICINE

## 2022-06-06 PROCEDURE — 2700000000 HC OXYGEN THERAPY PER DAY

## 2022-06-06 PROCEDURE — 80053 COMPREHEN METABOLIC PANEL: CPT

## 2022-06-06 PROCEDURE — 36415 COLL VENOUS BLD VENIPUNCTURE: CPT

## 2022-06-06 PROCEDURE — 6360000002 HC RX W HCPCS: Performed by: FAMILY MEDICINE

## 2022-06-06 PROCEDURE — 2580000003 HC RX 258: Performed by: INTERNAL MEDICINE

## 2022-06-06 PROCEDURE — 6370000000 HC RX 637 (ALT 250 FOR IP): Performed by: INTERNAL MEDICINE

## 2022-06-06 PROCEDURE — 2580000003 HC RX 258: Performed by: FAMILY MEDICINE

## 2022-06-06 PROCEDURE — 6370000000 HC RX 637 (ALT 250 FOR IP): Performed by: FAMILY MEDICINE

## 2022-06-06 PROCEDURE — 2060000000 HC ICU INTERMEDIATE R&B

## 2022-06-06 RX ADMIN — PREDNISONE 40 MG: 20 TABLET ORAL at 07:53

## 2022-06-06 RX ADMIN — NALOXONE HYDROCHLORIDE: 0.4 INJECTION, SOLUTION INTRAMUSCULAR; INTRAVENOUS; SUBCUTANEOUS at 05:31

## 2022-06-06 RX ADMIN — SODIUM CHLORIDE, PRESERVATIVE FREE 10 ML: 5 INJECTION INTRAVENOUS at 22:25

## 2022-06-06 RX ADMIN — ALBUTEROL SULFATE 2 PUFF: 90 AEROSOL, METERED RESPIRATORY (INHALATION) at 07:54

## 2022-06-06 RX ADMIN — ENOXAPARIN SODIUM 40 MG: 100 INJECTION SUBCUTANEOUS at 07:53

## 2022-06-06 RX ADMIN — SODIUM CHLORIDE, PRESERVATIVE FREE 10 ML: 5 INJECTION INTRAVENOUS at 07:53

## 2022-06-06 RX ADMIN — DIPHENHYDRAMINE HCL 25 MG: 25 TABLET ORAL at 13:02

## 2022-06-06 RX ADMIN — ALBUTEROL SULFATE 2 PUFF: 90 AEROSOL, METERED RESPIRATORY (INHALATION) at 22:24

## 2022-06-06 RX ADMIN — NALOXONE HYDROCHLORIDE: 0.4 INJECTION, SOLUTION INTRAMUSCULAR; INTRAVENOUS; SUBCUTANEOUS at 00:04

## 2022-06-06 RX ADMIN — Medication 1 CAPSULE: at 07:53

## 2022-06-06 ASSESSMENT — PAIN SCALES - GENERAL
PAINLEVEL_OUTOF10: 0

## 2022-06-06 NOTE — PROGRESS NOTES
Critical Care Team - Daily Progress Note         Date and time: 2022 6:15 AM  Patient's name:  Malini Dunbar  Medical Record Number: [id number]  Patient's account/billing number: [de-identified]  Patient's YOB: 1985  Age: 40 y.o. Date of Admission: 2022  6:52 AM  Length of stay during current admission: 2      Primary Care Physician: No primary care provider on file. ICU Attending Physician: Dr. Yanick Perea Status: Full Code    Reason for ICU admission: Opioid overdose      SUBJECTIVE:     OVERNIGHT EVENTS:         No acute overnight events. Patient states she is feeling much better. States she no longer feels drowsy. She is able to ambulate without SOB. Continues to have pain at site of sternal rub. Has a non-productive cough. States hat she has had daily regular BM. Denies headaches, SOB, CP, abdominal pain, N/V.     HPI + interval: 40 y.o. female with significant past medical history of drug use who presented to the ED yesterday after being found unresponsive and was given 4 mg Narcan by EMS which she did respond to. Patient admits to snorting what she thought was cocaine at the time however states she believes it was fentanyl now. Narcan drip was started and stopped. Patient continued to become drowsy and responded to narcan; narcan infusion was therefore restarted.        OBJECTIVE:     VITAL SIGNS:  BP (!) 140/90   Pulse 54   Temp 97.8 °F (36.6 °C) (Oral)   Resp 21   Ht 5' 3\" (1.6 m)   Wt 191 lb 12.8 oz (87 kg)   LMP 2022   SpO2 98%   BMI 33.98 kg/m²   Tmax over 24 hours:  Temp (24hrs), Av.2 °F (36.8 °C), Min:97.8 °F (36.6 °C), Max:98.9 °F (37.2 °C)      Patient Vitals for the past 6 hrs:   BP Temp Temp src Pulse Resp SpO2 Weight   22 0600 (!) 140/90 97.8 °F (36.6 °C) Oral 54 21 98 % 191 lb 12.8 oz (87 kg)   22 0500 128/79   58 22 99 %    22 0400 128/79 98 °F (36.7 °C) Oral 54 17 98 %    22 0300 129/79   54 19 99 %    22 0200 104/62 97.9 °F (36.6 °C) Oral 65 26 91 %    06/06/22 0100 120/74   57 27 98 %          Intake/Output Summary (Last 24 hours) at 6/6/2022 0615  Last data filed at 6/6/2022 0500  Gross per 24 hour   Intake 1909 ml   Output    Net 1909 ml     Wt Readings from Last 2 Encounters:   06/06/22 191 lb 12.8 oz (87 kg)   03/05/21 200 lb (90.7 kg)     Body mass index is 33.98 kg/m².         PHYSICAL EXAMINATION:    General appearance - alert, well appearing, and in no distress  Mental status - alert, oriented to person, place, and time  Eyes - pupils equal and reactive, extraocular eye movements intact  Ears - bilateral TM's and external ear canals normal  Nose - normal and patent, no erythema, discharge or polyps  Mouth - mucous membranes moist, pharynx normal without lesions  Neck - supple, no significant adenopathy  Chest - clear to auscultation, no wheezes, rales or rhonchi, symmetric air entry, Mild tenderness anterior chest  Heart - normal rate, regular rhythm, normal S1, S2, no murmurs, rubs, clicks or gallops  Abdomen - soft, nontender, nondistended, no masses or organomegaly  Neurological - alert, oriented, normal speech, no focal findings or movement disorder noted  Extremities - peripheral pulses normal, no pedal edema, no clubbing or cyanosis  Skin - normal coloration and turgor, no rashes, no suspicious skin lesions noted     MEDICATIONS:    Scheduled Meds:   predniSONE  40 mg Oral Daily with breakfast    nicotine  1 patch TransDERmal Daily    lactobacillus  1 capsule Oral Daily    sodium chloride flush  5-40 mL IntraVENous 2 times per day    enoxaparin  40 mg SubCUTAneous Daily     Continuous Infusions:   IV infusion builder 100 mL/hr at 06/06/22 0531    sodium chloride       PRN Meds:   albuterol sulfate HFA, 2 puff, Q6H PRN  traMADol, 50 mg, PRN  cloNIDine, 0.1 mg, Q6H PRN  ibuprofen, 800 mg, Q8H PRN  sodium chloride, 2 spray, Q2H PRN  diphenhydrAMINE, 25 mg, Q6H PRN  sodium chloride flush, 5-40 mL, PRN  sodium chloride, , PRN  ondansetron, 4 mg, Q8H PRN   Or  ondansetron, 4 mg, Q6H PRN  polyethylene glycol, 17 g, Daily PRN  acetaminophen, 650 mg, Q6H PRN   Or  acetaminophen, 650 mg, Q6H PRN          VENT SETTINGS (Comprehensive) (if applicable): Additional Respiratory Assessments  Heart Rate: 54  Resp: 21  SpO2: 98 %    ABGs:   Recent Labs     06/04/22  1808   PH 7.322*   PCO2 51.4*   PO2 85.8   HCO3 26.0   BE -0.7   O2SAT 96.1       Laboratory findings:    Complete Blood Count:   Recent Labs     06/04/22  1054 06/05/22  0501 06/06/22  0514   WBC 24.4* 12.8* 9.0   HGB 14.2 12.4 10.5*   HCT 43.0 37.4 32.3*    329 238        Last 3 Blood Glucose:   Recent Labs     06/04/22  1054 06/05/22  0501 06/06/22  0514   GLUCOSE 116* 112* 123*        PT/INR:  No results found for: PROTIME, INR  PTT:  No results found for: APTT, PTT    Comprehensive Metabolic Profile:   Recent Labs     06/04/22  1054 06/05/22  0501 06/05/22  0501 06/06/22  0514    138  --  139   K 4.0 4.5  --  4.0   CL 97* 101  --  105   CO2 23 27  --  25   BUN 8 6  --  7   CREATININE 0.8 0.7  --  0.7   GLUCOSE 116* 112*  --  123*   CALCIUM 9.5 8.8  --  8.2*   PROT  --  6.6   < > 5.6*   LABALBU  --  4.0   < > 3.4*   BILITOT  --  0.2   < > <0.2   ALKPHOS  --  74   < > 56   AST  --  17   < > 12   ALT  --  15   < > 12    < > = values in this interval not displayed. Magnesium: No results found for: MG  Phosphorus: No results found for: PHOS  Ionized Calcium: No results found for: CAION     Urinalysis: No results found for: UA     Troponin: No results for input(s): TROPONINI in the last 72 hours.     Microbiology: No results found for: CULTBLDPCRRP, ISO1, CX, BLOODCULT2, BFCX, CULTUREFLUID, CULTRESP, RESPVCX      Radiology/Imaging:     CXR 6/4/22  No acute process.         CT Scans   CT Chest 6/4/22  1.  Diffuse ground-glass opacities throughout both lungs, with more   prominence dependently within the lower lobes.  This could be secondary to   partial filling of airspaces/partial collapse of alveoli, however, true lung   pathology, including infectious etiologies, acute alveolar disease, chronic   interstitial lung disease, as well as drug induced lung disease/drug toxicity   cannot be excluded.  Increased ground-glass opacification at the lung bases   could also be secondary to dependent atelectasis.  Clinical correlation is   recommended.       2.  Multiple blebs in the periphery of the upper lobes and superior segments   of the lower lobes.  Rule out early paraseptal emphysema.       3.  Probable old right 11th rib fracture.  Clinical correlation is   recommended.          CT Head 6/4/22  1.  Tiny focus of slightly increased density in the right-side of the devonte   and best appreciated on 1 axial slice, as described above.  This is probably   artifactual.  The presence of a small lesion, including cavernous venous   malformation, cannot be excluded.  MRI of the brain with and without   intravenous gadolinium can be performed for further evaluation.       2.  Mild ethmoid and minimal left maxillary chronic sinusitis. ASSESSMENT:     Principal Problem:    Drug overdose, accidental or unintentional, initial encounter  Active Problems:    Acute respiratory failure with hypoxia and hypercapnia (HCC)    Tobacco abuse  Resolved Problems:    * No resolved hospital problems. *      PLAN:       SYSTEMS ASSESSMENT  Neuro   - Unresponsive secondary to opioid overdose  - Received multiple doses narcan including Narcan drip  - A&O x 3 today  - Plan to decrease rate of narcan drip and observe, likely wean off today     Respiratory   - Pneumonitis  - Prednisone 40mg x 5 days, day  2  - Acute Respiratory Acidosis (pH 7.32, pCO2 51.4) - improved  - Pt on 4L NC - Wean oxygen as tolerated.  Keep O2 sat 90-92%  - Albuterol PRN for cough   - AVAPS at night for nocturnal hypoxia  - Recommend sleep study     Cardiovascular   - No prev cardiac hx  -

## 2022-06-06 NOTE — PROGRESS NOTES
[facility] Progress Note    Admitting Date and Time: 6/4/2022  6:52 AM  Admit Dx: Drug abuse ([facility] [F19.10]  Opiate overdose, accidental or unintentional, initial encounter ([facility] [T40.601A]  Drug overdose, accidental or unintentional, initial encounter [T50.901A]  Leukocytosis, unspecified type [D72.829]    Subjective:  Patient is being followed for Drug abuse ([facility] [F19.10]  Opiate overdose, accidental or unintentional, initial encounter ([facility] [T40.601A]  Drug overdose, accidental or unintentional, initial encounter [T50.901A]  Leukocytosis, unspecified type [D72.829]     Pt denies complaints. Still having a cough. No fevers. On BIPap overnight. Denies pain. Per RN: nothing to report    ROS: denies fever, chills, cp, sob, n/v, HA unless stated above.       predniSONE  40 mg Oral Daily with breakfast    nicotine  1 patch TransDERmal Daily    lactobacillus  1 capsule Oral Daily    sodium chloride flush  5-40 mL IntraVENous 2 times per day    enoxaparin  40 mg SubCUTAneous Daily     albuterol sulfate HFA, 2 puff, Q6H PRN  traMADol, 50 mg, PRN  cloNIDine, 0.1 mg, Q6H PRN  ibuprofen, 800 mg, Q8H PRN  sodium chloride, 2 spray, Q2H PRN  diphenhydrAMINE, 25 mg, Q6H PRN  sodium chloride flush, 5-40 mL, PRN  sodium chloride, , PRN  ondansetron, 4 mg, Q8H PRN   Or  ondansetron, 4 mg, Q6H PRN  polyethylene glycol, 17 g, Daily PRN  acetaminophen, 650 mg, Q6H PRN   Or  acetaminophen, 650 mg, Q6H PRN         Objective:    /66   Pulse 57   Temp 97.9 °F (36.6 °C) (Oral)   Resp 12   Ht 5' 3\" (1.6 m)   Wt 191 lb 12.8 oz (87 kg)   LMP 05/28/2022   SpO2 97%   BMI 33.98 kg/m²     General Appearance: alert and oriented to person, place and time and in no acute distress  Skin: warm and dry, good turgor, no rashes, no jaundice  Head: normocephalic and atraumatic  Eyes: pupils equal, round, and reactive to light, extraocular eye movements intact, conjunctivae normal  Neck: neck supple and non tender without mass   Pulmonary/Chest: clear to auscultation bilaterally- no wheezes, rales or rhonchi, normal air movement, no respiratory distress on 4 liters NC lying in bed  Cardiovascular: normal rate, normal S1 and S2 and no carotid bruits  Abdomen: soft, non-tender, non-distended, normal bowel sounds, no masses or organomegaly  Extremities: no cyanosis, no clubbing and no edema  Neurologic: no cranial nerve deficit and speech normal        Recent Labs     06/04/22  1054 06/05/22  0501 06/06/22  0514    138 139   K 4.0 4.5 4.0   CL 97* 101 105   CO2 23 27 25   BUN 8 6 7   CREATININE 0.8 0.7 0.7   GLUCOSE 116* 112* 123*   CALCIUM 9.5 8.8 8.2*       Recent Labs     06/04/22  1054 06/05/22  0501 06/06/22  0514   WBC 24.4* 12.8* 9.0   RBC 4.45 3.85 3.26*   HGB 14.2 12.4 10.5*   HCT 43.0 37.4 32.3*   MCV 96.6 97.1 99.1   MCH 31.9 32.2 32.2   MCHC 33.0 33.2 32.5   RDW 12.7 13.0 12.5    329 238   MPV 9.7 9.7 10.2       Radiology:   None new    Assessment:    Principal Problem:    Drug overdose, accidental or unintentional, initial encounter  Active Problems:    Acute respiratory failure with hypoxia and hypercapnia (HCC)    Tobacco abuse    Cocaine abuse (HCC)    Overdose opiate, accidental or unintentional, initial encounter ([facility]    Cannabis abuse    Pneumonitis    Acute respiratory acidosis  Resolved Problems:    * No resolved hospital problems. *      Plan:  1. Drug overdose -- opiate, fentanyl, cannabis and cocaine in urine drug screen as well. Consider could be methadone as her respiratory failure returns once Narcan wears off, still desaturating today on hospital day #3; could also has desaturation due to COPD exacerbation versus aspiration pneumonia/pneumonitis.  -Continue watch in ICU  -Critical care/pulmonology ongoing  -Narcan drip -- to be weaned today  -wean O2 as able  -Intensive RT  -On prednisone 40 mg daily, X 5 days total, seems to be helping     2.   Tobacco abuse -- Nicoderm patch     3. Acute respiratory failure, hypoxic and hypercapnic  -As above  -Possibly has BRENNEN -- outpatient sleep study per Dr. Mi Guan     DISP: to home without services  Will offer rehab inpatient stay,  to see patient for this. NOTE: This report was transcribed using voice recognition software. Every effort was made to ensure accuracy; however, inadvertent computerized transcription errors may be present.     Electronically signed by Donny Etienne DO on 6/6/2022 at 10:39 AM

## 2022-06-06 NOTE — PROGRESS NOTES
06/05/22 2300   NIV Type   Skin Protection for O2 Device Yes   Mode AVAPS   Mask Type Full face mask   Mask Size Small   Settings/Measurements   CPAP/EPAP 8 cmH2O   Vt (Set, mL) 450 mL   Rate Ordered 18   Resp 20   FiO2  40 %   Vt Exhaled 640 mL   Mask Leak (lpm) 41 lpm   Comfort Level Good   Using Accessory Muscles No   SpO2 100   Patient Observation   Observations red outlet   Alarm Settings   Alarms On Y   Low Pressure   (in wall)

## 2022-06-06 NOTE — CARE COORDINATION
Admit ICU for accidental drug overdose. Pt was snorting cocaine laced with something. Narcan given. Pt initially on narcan drip and weaned. Pt drowsy in room. Attempted to speak with her. Will follow up. According to staff, she does not want help with drug issues. Will follow-mjo    The Plan for Transition of Care is related to the following treatment goals: home     The Patient and/or patient representative pt was provided with a choice of provider and agrees   with the discharge plan. [x] Yes [] No    Freedom of choice list was provided with basic dialogue that supports the patient's individualized plan of care/goals, treatment preferences and shares the quality data associated with the providers.  [x] Yes [] No

## 2022-06-06 NOTE — PLAN OF CARE
Problem: Discharge Planning  Goal: Discharge to home or other facility with appropriate resources  Outcome: Progressing     Problem: Safety - Adult  Goal: Free from fall injury  Outcome: Progressing  Flowsheets (Taken 6/6/2022 0000)  Free From Fall Injury: Instruct family/caregiver on patient safety

## 2022-06-07 VITALS
WEIGHT: 191.8 LBS | HEART RATE: 68 BPM | DIASTOLIC BLOOD PRESSURE: 92 MMHG | SYSTOLIC BLOOD PRESSURE: 147 MMHG | TEMPERATURE: 98 F | BODY MASS INDEX: 33.98 KG/M2 | HEIGHT: 63 IN | RESPIRATION RATE: 18 BRPM | OXYGEN SATURATION: 96 %

## 2022-06-07 LAB
ALBUMIN SERPL-MCNC: 3.9 G/DL (ref 3.5–5.2)
ALP BLD-CCNC: 70 U/L (ref 35–104)
ALT SERPL-CCNC: 13 U/L (ref 0–32)
ANION GAP SERPL CALCULATED.3IONS-SCNC: 12 MMOL/L (ref 7–16)
AST SERPL-CCNC: 12 U/L (ref 0–31)
BASOPHILS ABSOLUTE: 0.01 E9/L (ref 0–0.2)
BASOPHILS RELATIVE PERCENT: 0.1 % (ref 0–2)
BILIRUB SERPL-MCNC: <0.2 MG/DL (ref 0–1.2)
BUN BLDV-MCNC: 7 MG/DL (ref 6–20)
CALCIUM SERPL-MCNC: 9 MG/DL (ref 8.6–10.2)
CHLORIDE BLD-SCNC: 100 MMOL/L (ref 98–107)
CO2: 27 MMOL/L (ref 22–29)
CREAT SERPL-MCNC: 0.7 MG/DL (ref 0.5–1)
EOSINOPHILS ABSOLUTE: 0.03 E9/L (ref 0.05–0.5)
EOSINOPHILS RELATIVE PERCENT: 0.3 % (ref 0–6)
GFR AFRICAN AMERICAN: >60
GFR NON-AFRICAN AMERICAN: >60 ML/MIN/1.73
GLUCOSE BLD-MCNC: 137 MG/DL (ref 74–99)
HCT VFR BLD CALC: 33 % (ref 34–48)
HEMOGLOBIN: 10.9 G/DL (ref 11.5–15.5)
IMMATURE GRANULOCYTES #: 0.06 E9/L
IMMATURE GRANULOCYTES %: 0.5 % (ref 0–5)
LYMPHOCYTES ABSOLUTE: 3.08 E9/L (ref 1.5–4)
LYMPHOCYTES RELATIVE PERCENT: 28.1 % (ref 20–42)
MAGNESIUM: 2 MG/DL (ref 1.6–2.6)
MCH RBC QN AUTO: 31.9 PG (ref 26–35)
MCHC RBC AUTO-ENTMCNC: 33 % (ref 32–34.5)
MCV RBC AUTO: 96.5 FL (ref 80–99.9)
MONOCYTES ABSOLUTE: 0.72 E9/L (ref 0.1–0.95)
MONOCYTES RELATIVE PERCENT: 6.6 % (ref 2–12)
NEUTROPHILS ABSOLUTE: 7.05 E9/L (ref 1.8–7.3)
NEUTROPHILS RELATIVE PERCENT: 64.4 % (ref 43–80)
PDW BLD-RTO: 12.6 FL (ref 11.5–15)
PHOSPHORUS: 3.6 MG/DL (ref 2.5–4.5)
PLATELET # BLD: 276 E9/L (ref 130–450)
PMV BLD AUTO: 10.2 FL (ref 7–12)
POTASSIUM SERPL-SCNC: 3.8 MMOL/L (ref 3.5–5)
RBC # BLD: 3.42 E12/L (ref 3.5–5.5)
SODIUM BLD-SCNC: 139 MMOL/L (ref 132–146)
TOTAL PROTEIN: 6.7 G/DL (ref 6.4–8.3)
WBC # BLD: 11 E9/L (ref 4.5–11.5)

## 2022-06-07 PROCEDURE — 85025 COMPLETE CBC W/AUTO DIFF WBC: CPT

## 2022-06-07 PROCEDURE — 83735 ASSAY OF MAGNESIUM: CPT

## 2022-06-07 PROCEDURE — 6370000000 HC RX 637 (ALT 250 FOR IP): Performed by: INTERNAL MEDICINE

## 2022-06-07 PROCEDURE — 6360000002 HC RX W HCPCS: Performed by: INTERNAL MEDICINE

## 2022-06-07 PROCEDURE — 36415 COLL VENOUS BLD VENIPUNCTURE: CPT

## 2022-06-07 PROCEDURE — 2580000003 HC RX 258: Performed by: INTERNAL MEDICINE

## 2022-06-07 PROCEDURE — 99238 HOSP IP/OBS DSCHRG MGMT 30/<: CPT | Performed by: FAMILY MEDICINE

## 2022-06-07 PROCEDURE — 80053 COMPREHEN METABOLIC PANEL: CPT

## 2022-06-07 PROCEDURE — 94660 CPAP INITIATION&MGMT: CPT

## 2022-06-07 PROCEDURE — 84100 ASSAY OF PHOSPHORUS: CPT

## 2022-06-07 RX ORDER — IBUPROFEN 600 MG/1
600 TABLET ORAL EVERY 6 HOURS PRN
Qty: 15 TABLET | Refills: 0 | Status: SHIPPED | OUTPATIENT
Start: 2022-06-07 | End: 2022-06-12

## 2022-06-07 RX ORDER — NICOTINE 21 MG/24HR
1 PATCH, TRANSDERMAL 24 HOURS TRANSDERMAL DAILY
Qty: 30 PATCH | Refills: 3 | Status: SHIPPED | OUTPATIENT
Start: 2022-06-08 | End: 2022-06-15

## 2022-06-07 RX ORDER — ALBUTEROL SULFATE 90 UG/1
2 AEROSOL, METERED RESPIRATORY (INHALATION) EVERY 6 HOURS PRN
Qty: 1 EACH | Refills: 0 | Status: SHIPPED | OUTPATIENT
Start: 2022-06-07

## 2022-06-07 RX ORDER — PREDNISONE 20 MG/1
40 TABLET ORAL
Qty: 4 TABLET | Refills: 0 | Status: SHIPPED | OUTPATIENT
Start: 2022-06-08 | End: 2022-06-10

## 2022-06-07 RX ADMIN — ENOXAPARIN SODIUM 40 MG: 100 INJECTION SUBCUTANEOUS at 09:53

## 2022-06-07 RX ADMIN — Medication 1 CAPSULE: at 09:52

## 2022-06-07 RX ADMIN — PREDNISONE 40 MG: 20 TABLET ORAL at 09:52

## 2022-06-07 RX ADMIN — SODIUM CHLORIDE, PRESERVATIVE FREE 10 ML: 5 INJECTION INTRAVENOUS at 09:53

## 2022-06-07 ASSESSMENT — PAIN SCALES - GENERAL: PAINLEVEL_OUTOF10: 0

## 2022-06-07 NOTE — DISCHARGE INSTR - DIET

## 2022-06-07 NOTE — PROGRESS NOTES
CLINICAL PHARMACY NOTE: MEDS TO BEDS    Total # of Prescriptions Filled: 4   The following medications were delivered to the patient:  · Nicotine 21 mg  · Prednisone 20 mg  ·  mg  · Ventolin HFA    Additional Documentation:

## 2022-06-07 NOTE — CARE COORDINATION
6/7/2022  Social Work Discharge Planning:Accidental drug overdose. Pt is from home with her family. Pt is declining any Peer Recovery or other rehab resources. Plan is home with able. No needs at this time. Electronically signed by PAT Dempsey on 6/7/2022 at 9:33 AM

## 2022-06-07 NOTE — PROGRESS NOTES
Pulse ox was 99% on room air at rest.   Ambulated patient on room air. Oxygen saturation was 90% on room air while ambulating.    Recovery pulse ox was 95%

## 2022-06-07 NOTE — PROGRESS NOTES
Samantha Goins M.D.,[facility]  Sudhakar Reis D.O., F.A.C.O.I., Manpreet Sampson M.D. Eloisa Pinzon M.D. Kendrick Bridges D.O. Daily Pulmonary Progress Note    Patient:  Malini Dunbar 40 y.o. female MRN: [id number]     Date of Service: 6/7/2022      Synopsis     We are following patient for possible BRENNEN    \"CC\" drug overdose    Code status: FULL      Subjective      Patient was seen and examined. Sleeping on bed on room air in NAD. Patient not currently using PAP therapy but did use it overnight. Discussed possible BRENNEN diagnosis and recommendations for an OP sleep study. Patient is agreeable to it. She is anxious to be DC home. Review of Systems:  Constitutional: Denies fever, weight loss, night sweats, and fatigue  Skin: Denies pigmentation, dark lesions, and rashes   HEENT: Denies hearing loss, tinnitus, ear drainage, epistaxis, sore throat, and hoarseness. Cardiovascular: Denies palpitations, chest pain, and chest pressure.   Respiratory: Denies dyspnea at rest, hemoptysis, apnea, and choking. +congested cough- nonproductive   Gastrointestinal: Denies nausea, vomiting, poor appetite, diarrhea, heartburn or reflux  Genitourinary: Denies dysuria, frequency, urgency or hematuria  Musculoskeletal: Denies myalgias, muscle weakness, and bone pain  Neurological: Denies dizziness, vertigo, headache, and focal weakness  Psychological: Denies anxiety and depression  Endocrine: Denies heat intolerance and cold intolerance  Hematopoietic/Lymphatic: Denies bleeding problems and blood transfusions    24-hour events:  Out of ICU    Objective   Vitals: BP (!) 159/68   Pulse 51   Temp 97.5 °F (36.4 °C) (Axillary)   Resp 16   Ht 5' 3\" (1.6 m)   Wt 191 lb 12.8 oz (87 kg)   LMP 05/28/2022   SpO2 99%   BMI 33.98 kg/m²     I/O:    Intake/Output Summary (Last 24 hours) at 6/7/2022 1143  Last data filed at 6/6/2022 1800  Gross per 24 hour   Intake 71.67 ml   Output --   Net 71.67 ml        CPAP/EPAP: 8 cmH2O     CURRENT MEDS :  Scheduled Meds:   predniSONE  40 mg Oral Daily with breakfast    nicotine  1 patch TransDERmal Daily    lactobacillus  1 capsule Oral Daily    sodium chloride flush  5-40 mL IntraVENous 2 times per day    enoxaparin  40 mg SubCUTAneous Daily       Physical Exam:  General Appearance: appears comfortable in no acute distress. +obese  HEENT: Normocephalic atraumatic without obvious abnormality   Neck: Lips, mucosa, and tongue normal. Supple, symmetrical, trachea midline, no adenopathy;thyroid: no enlargement/tenderness/nodules or JVD. Lung: Breath sounds CTA. Respirations unlabored. Symmetrical expansion. Heart: RRR, normal S1, S2. No MRG  Abdomen: Soft, NT, ND. BS present x 4 quadrants. No bruit or organomegaly. Extremities: Pedal pulses 2+ symmetric b/l. Extremities normal, no cyanosis, clubbing, or edema. Musculokeletal: No joint swelling, no muscle tenderness. ROM normal in all joints of extremities. Neurologic: Mental status: Alert and Oriented X3    Pertinent/ New Labs and Imaging Studies     Imaging Personally Reviewed:  Nothing new    ECHO none on file      Labs:  Lab Results   Component Value Date    WBC 11.0 06/07/2022    HGB 10.9 06/07/2022    HCT 33.0 06/07/2022    MCV 96.5 06/07/2022    MCH 31.9 06/07/2022    MCHC 33.0 06/07/2022    RDW 12.6 06/07/2022     06/07/2022    MPV 10.2 06/07/2022     Lab Results   Component Value Date     06/07/2022    K 3.8 06/07/2022    K 4.0 06/06/2022     06/07/2022    CO2 27 06/07/2022    BUN 7 06/07/2022    CREATININE 0.7 06/07/2022    LABALBU 3.9 06/07/2022    CALCIUM 9.0 06/07/2022    GFRAA >60 06/07/2022    LABGLOM >60 06/07/2022     No results found for: PROTIME, INR  No results for input(s): PROBNP in the last 72 hours. No results for input(s): PROCAL in the last 72 hours. This SmartLink has not been configured with any valid records.        Micro:  Nothing new      Assessment:    Pneumonitis  Acute respiratory failure with hypoxia 2/2 drug overdose  Probably BRENNEN  Drug abuse, current  Tobacco abuse       Plan:   Weaned to room air  AVAPS HS and PRN  Ambulatory pulse ox testing   Albuterol PRN  Prednisone 40mg x5 days  Tobacco cessation education, Nicotine patch  Will need OP sleep study- ordered  Okay to DC from pulmonary POV when okay with others       This plan of care was reviewed in collaboration with Dr. Edgar Villafuerte   Electronically signed by CHASE Faustin - CNP on 6/7/2022 at 11:43 AM      I personally saw, examined, and cared for the patient. Labs, medications, radiographs reviewed. I agree with history exam and plans detailed in NP note.         Gary Sam,

## 2022-06-09 NOTE — DISCHARGE SUMMARY
[facility] Physician Discharge Summary       Bal Mendoza MD  [address] [place] [address] [phone]    In 1 week  Follow up of respiratory failure    [facility]) Physicians Pre-Service  [phone]  Call  to find a family doctor near you      Activity level: as tolerated    Diet: regular  Labs: none    Condition at discharge: good/stable    Dispo: to home without services    Outpatient sleep study ordered      Patient ID:  Antoni Rivers  [id number]  40 y.o.  1985    Admit date: 6/4/2022    Discharge date and time:  6/9/2022  11:00 AM    Admission Diagnoses: Principal Problem:    Drug overdose, accidental or unintentional, initial encounter  Active Problems:    Acute respiratory failure with hypoxia and hypercapnia (HCC)    Tobacco abuse    Cocaine abuse ([address]    Overdose opiate, accidental or unintentional, initial encounter ([address]    Cannabis abuse    Pneumonitis    Acute respiratory acidosis  Resolved Problems:    * No resolved hospital problems. *      Discharge Diagnoses: Principal Problem:    Drug overdose, accidental or unintentional, initial encounter  Active Problems:    Acute respiratory failure with hypoxia and hypercapnia (HCC)    Tobacco abuse    Cocaine abuse (HCC)    Overdose opiate, accidental or unintentional, initial encounter ([address]    Cannabis abuse    Pneumonitis    Acute respiratory acidosis  Resolved Problems:    * No resolved hospital problems. *      Consults:  IP CONSULT TO CRITICAL CARE    Procedures:   BIPAP    Hospital Course:   40 yr old female smoker presented to the ER from home via paramedics after she became unarousable after snorting what she thought was cocaine -- in the ER she required multiple doses of Narcan to wake up. She was then placed on the regular floor but became obtunded again and so was placed in the ICU with a Narcan drip. She required BIPAP a couple of times.   About 36 hours into her hospital stay, she was weaned off the Narcan drip and placed on the regular floor. CT of chest showed multi-focal bilateral ground glass opacities, non-specific and etiology is broad. Old right 11th rib fracture seen. Blebs in the upper lobes found consistent with paraseptal emphysema. Seen by Critical Care/pulmonology and thought to probably have underlying sleep apnea. Her BMI is 33. An outpatient sleep study has been ordered. Did have a cough and chest discomfort with cough/movement -- placed on prednisone 40 mg daily, to finish a total of 5 days. Pt's oxygenation greatly improved, and cough resolved once she was started on this. Patient declined drug rehab services. On day of discharge, patient's ambulatory pulse ox stays above 90. On day of discharge, patient is eating/drinking well. No fevers. No events overnight. Feeling very well. No pain. No chest pain. No cough/wheezing. Discharge Exam:  Alert, in NAD, not in respiratory distress, breathing comfortably on RA at rest in bed, mental status normal, well appearing  Eyes and mouth clear, moist, tongue and uvula midline, neck supple, no JVD  S1/S2 with RRR, no M/R/G  Lungs CTA bilaterally, no W/R/R  Abd soft/NT/ND/normoactive BS's  No LE edema, pos pedal pulses, no cyanosis, good cap refill of digits  Skin warm, dry, good turgor, no rashes, no jaundice       I/O last 3 completed shifts: In: 2220.7 [P.O.:240; I.V.:1980.7]  Out: -   No intake/output data recorded. LABS:  Recent Labs     06/07/22  0351      K 3.8      CO2 27   BUN 7   CREATININE 0.7   GLUCOSE 137*   CALCIUM 9.0       Recent Labs     06/07/22  0351   WBC 11.0   RBC 3.42*   HGB 10.9*   HCT 33.0*   MCV 96.5   MCH 31.9   MCHC 33.0   RDW 12.6      MPV 10.2       No results for input(s): POCGLU in the last 72 hours.     Imaging:  CT Head WO Contrast  Result Date: 6/4/2022  EXAMINATION: CT OF THE HEAD WITHOUT CONTRAST  6/4/2022 1:27 pm TECHNIQUE: CT of the head was performed without the administration of intravenous contrast. Automated exposure control, iterative reconstruction, and/or weight based adjustment of the mA/kV was utilized to reduce the radiation dose to as low as reasonably achievable. COMPARISON: None. HISTORY: ORDERING SYSTEM PROVIDED HISTORY: cocaine overdose TECHNOLOGIST PROVIDED HISTORY: Reason for exam:->cocaine overdose Has a \"code stroke\" or \"stroke alert\" been called? ->No Decision Support Exception - unselect if not a suspected or confirmed emergency medical condition->Emergency Medical Condition (MA) FINDINGS: BRAIN/VENTRICLES: There is no evidence of midline shift or mass effect. No gross mass is identified. There is no evidence of hemorrhage or contusion. In the right-side of the devonte there is a subcentimeter focus of slightly increased density and best appreciated on 1 axial slice best (image 26 on series 3). This is probably artifactual.  The presence of a small lesion, including cavernous venous malformation, cannot be excluded. MRI of the brain with and without intravenous gadolinium can be performed for further evaluation. The ventricular system is unremarkable. No abnormal focus of low attenuation is seen within the brain parenchyma. ORBITS: The visualized portion of the orbits demonstrate no acute abnormality. SINUSES: Mild mucosal thickening is seen involving the ethmoid sinus, indicating chronic sinusitis. Minimal mucosal thickening is seen involving the inferior left maxillary sinus, indicating minimal chronic sinusitis. The mastoid air cells are clear. SOFT TISSUES/SKULL:  No acute abnormality of the visualized skull or soft tissues. 1.  Tiny focus of slightly increased density in the right-side of the devonte and best appreciated on 1 axial slice, as described above. This is probably artifactual.  The presence of a small lesion, including cavernous venous malformation, cannot be excluded.   MRI of the brain with and without intravenous gadolinium can be performed for further evaluation. 2.  Mild ethmoid and minimal left maxillary chronic sinusitis. CT CHEST WO CONTRAST  Result Date: 6/4/2022  EXAMINATION: CT OF THE CHEST WITHOUT CONTRAST 6/4/2022 1:27 pm TECHNIQUE: CT of the chest was performed without the administration of intravenous contrast. Multiplanar reformatted images are provided for review. Automated exposure control, iterative reconstruction, and/or weight based adjustment of the mA/kV was utilized to reduce the radiation dose to as low as reasonably achievable. COMPARISON: Correlation is made with chest radiograph performed earlier in the day. HISTORY: ORDERING SYSTEM PROVIDED HISTORY: elevated wbc and jonathan overdose TECHNOLOGIST PROVIDED HISTORY: Reason for exam:->elevated wbc and jonathan overdose Decision Support Exception - unselect if not a suspected or confirmed emergency medical condition->Emergency Medical Condition (MA) FINDINGS: Mediastinum: No axillary, hilar, or mediastinal lymphadenopathy is identified. The thoracic aorta is normal in caliber. The remainder of the visualized pulmonary vasculature is unremarkable for a non IV contrast study. The heart is within normal limits in size. The visualized thyroid gland, as well as the esophagus and trachea are unremarkable. Lungs/pleura: There are diffuse ground-glass opacities throughout both lungs. There is slightly more prominence dependently within the lower lobes. Although this could be secondary to partial filling of airspaces/partial collapse of alveoli, true lung pathology, including infectious etiologies, acute alveolar disease, chronic interstitial lung disease, as well as drug induced lung disease/drug toxicity cannot be excluded. The increased ground-glass opacification at the lung bases could also be secondary to dependent atelectasis. Further evaluation needs to be based on clinical grounds.   Multiple blebs are identified in the periphery of the upper lobes and superior segments of the lower lobes. Rule out early paraseptal emphysema. No active pleural disease is seen. No pulmonary parenchymal nodule or mass is identified. Upper Abdomen: No acute abnormality identified. Soft Tissues/Bones: A nondisplaced posterior right 11th rib fracture is seen. There appear to be healing changes about it, indicating chronicity. Clinical correlation is recommended. 1.  Diffuse ground-glass opacities throughout both lungs, with more prominence dependently within the lower lobes. This could be secondary to partial filling of airspaces/partial collapse of alveoli, however, true lung pathology, including infectious etiologies, acute alveolar disease, chronic interstitial lung disease, as well as drug induced lung disease/drug toxicity cannot be excluded. Increased ground-glass opacification at the lung bases could also be secondary to dependent atelectasis. Clinical correlation is recommended. 2.  Multiple blebs in the periphery of the upper lobes and superior segments of the lower lobes. Rule out early paraseptal emphysema. 3.  Probable old right 11th rib fracture. Clinical correlation is recommended. XR CHEST PORTABLE  Result Date: 6/4/2022  EXAMINATION: ONE XRAY VIEW OF THE CHEST 6/4/2022 8:17 am COMPARISON: None. HISTORY: ORDERING SYSTEM PROVIDED HISTORY: hypoxia TECHNOLOGIST PROVIDED HISTORY: Reason for exam:->hypoxia FINDINGS: The lungs are without acute focal process. There is no effusion or pneumothorax. The cardiomediastinal silhouette is without acute process. The osseous structures are without acute process. No acute process.          Patient Instructions:   Discharge Medication List as of 6/7/2022  1:46 PM      START taking these medications    Details   nicotine (NICODERM CQ) 21 MG/24HR Place 1 patch onto the skin daily for 7 days, Disp-30 patch, R-3Normal      sodium chloride (OCEAN, BABY AYR) 0.65 % nasal spray 2 sprays by Nasal route as needed for Congestion, R-0OTC      predniSONE (DELTASONE) 20 MG tablet Take 2 tablets by mouth daily (with breakfast) for 2 days, Disp-4 tablet, R-0Normal      albuterol sulfate HFA (PROVENTIL;VENTOLIN;PROAIR) 108 (90 Base) MCG/ACT inhaler Inhale 2 puffs into the lungs every 6 hours as needed for Wheezing or Shortness of Breath, Disp-1 each, R-0Normal         CONTINUE these medications which have CHANGED    Details   ibuprofen (ADVIL;MOTRIN) 600 MG tablet Take 1 tablet by mouth every 6 hours as needed for Pain, Disp-15 tablet, R-0Normal         STOP taking these medications       amitriptyline (ELAVIL) 10 MG tablet Comments:   Reason for Stopping:         sucralfate (CARAFATE) 1 GM/10ML suspension Comments:   Reason for Stopping:         Probiotic Product (ALIGN) 4 MG CAPS Comments:   Reason for Stopping:         dicyclomine (BENTYL) 10 MG capsule Comments:   Reason for Stopping:                 Note that less than 30 minutes was spent in preparing discharge papers, discussing discharge with patient, medication review, etc.    NOTE: This report was transcribed using voice recognition software. Every effort was made to ensure accuracy; however, inadvertent computerized transcription errors may be present.      Signed:  Electronically signed by Carmen Peralta DO on 6/9/2022 at 11:00 AM

## 2023-06-14 PROCEDURE — 99283 EMERGENCY DEPT VISIT LOW MDM: CPT

## 2023-06-15 ENCOUNTER — APPOINTMENT (OUTPATIENT)
Dept: GENERAL RADIOLOGY | Age: 38
End: 2023-06-15
Payer: MEDICAID

## 2023-06-15 ENCOUNTER — HOSPITAL ENCOUNTER (EMERGENCY)
Age: 38
Discharge: HOME | End: 2023-06-15
Payer: MEDICAID

## 2023-06-15 VITALS
SYSTOLIC BLOOD PRESSURE: 116 MMHG | WEIGHT: 200 LBS | DIASTOLIC BLOOD PRESSURE: 79 MMHG | OXYGEN SATURATION: 99 % | HEART RATE: 78 BPM | BODY MASS INDEX: 35.44 KG/M2 | HEIGHT: 63 IN | RESPIRATION RATE: 16 BRPM | TEMPERATURE: 98.3 F

## 2023-06-15 DIAGNOSIS — R09.89 FOREIGN BODY SENSATION IN THROAT: Primary | ICD-10-CM

## 2023-06-15 PROCEDURE — 71045 X-RAY EXAM CHEST 1 VIEW: CPT

## 2023-06-15 PROCEDURE — 70360 X-RAY EXAM OF NECK: CPT

## 2023-06-15 PROCEDURE — 6370000000 HC RX 637 (ALT 250 FOR IP): Performed by: NURSE PRACTITIONER

## 2023-06-15 RX ADMIN — LIDOCAINE HYDROCHLORIDE: 20 SOLUTION ORAL; TOPICAL at 01:34

## 2023-06-15 ASSESSMENT — PAIN DESCRIPTION - PAIN TYPE: TYPE: ACUTE PAIN

## 2023-06-15 ASSESSMENT — PAIN DESCRIPTION - LOCATION: LOCATION: THROAT

## 2023-06-15 ASSESSMENT — PAIN - FUNCTIONAL ASSESSMENT: PAIN_FUNCTIONAL_ASSESSMENT: 0-10

## 2023-06-15 NOTE — ED NOTES
Swallow screening complete at this time and patient able to tolerate swallowing without coughing or choking. Patient complains of pain with swallowing.  Provider notified      Adam King RN  06/15/23 [id number]

## 2023-07-18 ENCOUNTER — OFFICE VISIT (OUTPATIENT)
Dept: NEUROSURGERY | Age: 38
End: 2023-07-18
Payer: MEDICAID

## 2023-07-18 VITALS
OXYGEN SATURATION: 97 % | DIASTOLIC BLOOD PRESSURE: 88 MMHG | SYSTOLIC BLOOD PRESSURE: 132 MMHG | WEIGHT: 220 LBS | HEART RATE: 68 BPM | BODY MASS INDEX: 37.56 KG/M2 | TEMPERATURE: 97.8 F | HEIGHT: 64 IN

## 2023-07-18 DIAGNOSIS — G56.22 CUBITAL TUNNEL SYNDROME ON LEFT: ICD-10-CM

## 2023-07-18 DIAGNOSIS — M21.372 ACQUIRED LEFT FOOT DROP: ICD-10-CM

## 2023-07-18 DIAGNOSIS — G56.03 CARPAL TUNNEL SYNDROME ON BOTH SIDES: Primary | ICD-10-CM

## 2023-07-18 PROCEDURE — 4004F PT TOBACCO SCREEN RCVD TLK: CPT | Performed by: NEUROLOGICAL SURGERY

## 2023-07-18 PROCEDURE — 99204 OFFICE O/P NEW MOD 45 MIN: CPT | Performed by: NEUROLOGICAL SURGERY

## 2023-07-18 PROCEDURE — G8427 DOCREV CUR MEDS BY ELIG CLIN: HCPCS | Performed by: NEUROLOGICAL SURGERY

## 2023-07-18 PROCEDURE — G8419 CALC BMI OUT NRM PARAM NOF/U: HCPCS | Performed by: NEUROLOGICAL SURGERY

## 2023-07-18 PROCEDURE — 99202 OFFICE O/P NEW SF 15 MIN: CPT

## 2023-08-03 ENCOUNTER — TELEPHONE (OUTPATIENT)
Dept: NEUROSURGERY | Age: 38
End: 2023-08-03

## 2023-08-03 DIAGNOSIS — G56.03 CARPAL TUNNEL SYNDROME ON BOTH SIDES: Primary | ICD-10-CM

## 2023-08-07 ENCOUNTER — HOSPITAL ENCOUNTER (OUTPATIENT)
Dept: NEUROLOGY | Age: 38
Discharge: HOME | End: 2023-08-07
Payer: MEDICAID

## 2023-08-07 DIAGNOSIS — G56.03 CARPAL TUNNEL SYNDROME ON BOTH SIDES: ICD-10-CM

## 2023-08-07 PROCEDURE — 95911 NRV CNDJ TEST 9-10 STUDIES: CPT

## 2023-08-07 PROCEDURE — 95886 MUSC TEST DONE W/N TEST COMP: CPT

## 2023-11-12 ENCOUNTER — HOSPITAL ENCOUNTER (EMERGENCY)
Age: 38
Discharge: LEFT BEFORE DISCHARGE | End: 2023-11-12
Attending: EMERGENCY MEDICINE
Payer: OTHER MISCELLANEOUS

## 2023-11-12 ENCOUNTER — APPOINTMENT (OUTPATIENT)
Dept: GENERAL RADIOLOGY | Age: 38
End: 2023-11-12
Payer: OTHER MISCELLANEOUS

## 2023-11-12 VITALS
SYSTOLIC BLOOD PRESSURE: 134 MMHG | DIASTOLIC BLOOD PRESSURE: 73 MMHG | TEMPERATURE: 97.9 F | OXYGEN SATURATION: 100 % | HEART RATE: 80 BPM | RESPIRATION RATE: 16 BRPM

## 2023-11-12 DIAGNOSIS — S09.90XA INJURY OF HEAD, INITIAL ENCOUNTER: ICD-10-CM
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
## 2023-11-12 LAB
HCG, URINE, POC: NEGATIVE
Lab: NORMAL
NEGATIVE QC PASS/FAIL: NORMAL
POSITIVE QC PASS/FAIL: NORMAL

## 2023-11-12 PROCEDURE — 99283 EMERGENCY DEPT VISIT LOW MDM: CPT

## 2023-11-12 ASSESSMENT — PAIN - FUNCTIONAL ASSESSMENT: PAIN_FUNCTIONAL_ASSESSMENT: NONE - DENIES PAIN

## 2023-11-12 NOTE — ED NOTES
At this time g[place] pd walked back to the bed with the  section nurse and the patient was no longer in the bed.  The nurse and PD looked for the patient and she had walked out     Radha Talley RN  11/12/23 0651

## 2023-11-12 NOTE — ED NOTES
At this time this nurse attempted to call the patient and no response at this time.  Message left      Lisa Olivo RN  11/12/23 1524

## 2024-04-12 ENCOUNTER — HOSPITAL ENCOUNTER (EMERGENCY)
Age: 39
Discharge: HOME | End: 2024-04-12
Attending: EMERGENCY MEDICINE

## 2024-04-12 VITALS
TEMPERATURE: 98.5 F | SYSTOLIC BLOOD PRESSURE: 133 MMHG | BODY MASS INDEX: 37.8 KG/M2 | WEIGHT: 220.2 LBS | RESPIRATION RATE: 20 BRPM | HEART RATE: 85 BPM | OXYGEN SATURATION: 100 % | DIASTOLIC BLOOD PRESSURE: 86 MMHG

## 2024-04-12 DIAGNOSIS — N61.0 MASTITIS: Primary | ICD-10-CM

## 2024-04-12 DIAGNOSIS — L03.319 CELLULITIS OF TRUNK, UNSPECIFIED SITE OF TRUNK: ICD-10-CM

## 2024-04-12 PROCEDURE — 6370000000 HC RX 637 (ALT 250 FOR IP): Performed by: EMERGENCY MEDICINE

## 2024-04-12 PROCEDURE — 99283 EMERGENCY DEPT VISIT LOW MDM: CPT

## 2024-04-12 RX ORDER — DOXYCYCLINE HYCLATE 100 MG
100 TABLET ORAL 2 TIMES DAILY
Qty: 20 TABLET | Refills: 0 | Status: SHIPPED | OUTPATIENT
Start: 2024-04-12 | End: 2024-04-22

## 2024-04-12 RX ORDER — AMOXICILLIN AND CLAVULANATE POTASSIUM 875; 125 MG/1; MG/1
1 TABLET, FILM COATED ORAL ONCE
Status: COMPLETED | OUTPATIENT
Start: 2024-04-12 | End: 2024-04-12

## 2024-04-12 RX ORDER — AMOXICILLIN AND CLAVULANATE POTASSIUM 875; 125 MG/1; MG/1
1 TABLET, FILM COATED ORAL 2 TIMES DAILY
Qty: 20 TABLET | Refills: 0 | Status: SHIPPED | OUTPATIENT
Start: 2024-04-12 | End: 2024-04-22

## 2024-04-12 RX ORDER — DOXYCYCLINE HYCLATE 100 MG/1
100 CAPSULE ORAL ONCE
Status: COMPLETED | OUTPATIENT
Start: 2024-04-12 | End: 2024-04-12

## 2024-04-12 RX ORDER — TRAMADOL HYDROCHLORIDE 50 MG/1
50 TABLET ORAL ONCE
Status: DISCONTINUED | OUTPATIENT
Start: 2024-04-12 | End: 2024-04-12 | Stop reason: HOSPADM

## 2024-04-12 RX ADMIN — DOXYCYCLINE HYCLATE 100 MG: 100 CAPSULE ORAL at 21:35

## 2024-04-12 RX ADMIN — AMOXICILLIN AND CLAVULANATE POTASSIUM 1 TABLET: 875; 125 TABLET, FILM COATED ORAL at 21:35

## 2024-04-12 ASSESSMENT — PAIN - FUNCTIONAL ASSESSMENT
PAIN_FUNCTIONAL_ASSESSMENT: ACTIVITIES ARE NOT PREVENTED
PAIN_FUNCTIONAL_ASSESSMENT: 0-10

## 2024-04-12 ASSESSMENT — LIFESTYLE VARIABLES
HOW MANY STANDARD DRINKS CONTAINING ALCOHOL DO YOU HAVE ON A TYPICAL DAY: PATIENT DOES NOT DRINK
HOW OFTEN DO YOU HAVE A DRINK CONTAINING ALCOHOL: NEVER

## 2024-04-12 ASSESSMENT — PAIN DESCRIPTION - LOCATION: LOCATION: BREAST

## 2024-04-12 ASSESSMENT — PAIN DESCRIPTION - PAIN TYPE: TYPE: ACUTE PAIN

## 2024-04-12 ASSESSMENT — PAIN DESCRIPTION - ONSET: ONSET: PROGRESSIVE

## 2024-04-12 ASSESSMENT — PAIN SCALES - GENERAL: PAINLEVEL_OUTOF10: 8

## 2024-04-12 ASSESSMENT — PAIN DESCRIPTION - FREQUENCY: FREQUENCY: CONTINUOUS

## 2024-04-12 ASSESSMENT — PAIN DESCRIPTION - ORIENTATION: ORIENTATION: RIGHT

## 2024-04-12 ASSESSMENT — PAIN DESCRIPTION - DESCRIPTORS: DESCRIPTORS: DISCOMFORT;BURNING

## 2024-04-13 NOTE — ED PROVIDER NOTES
HPI:  4/12/24,   Time: 9:31 PM EDT         Wilma Silva is a 39 y.o. female presenting to the ED for right breast, redness and pain beginning days ago.  The complaint has been persistent, mild in severity, and worsened by nothing.  She is denies any chance of being pregnant she is not breast-feeding show I did tell her that this could be a breast mass such as some chronic cancer and she needs to follow-up with the breast surgeon as soon as possible get an ultrasound mammogram or MRI of her breast is soon as possible I did start her on Augmentin and doxycycline once again she denies any chance of being pregnant or breast-feeding    ROS:   Pertinent positives and negatives are stated within HPI, all other systems reviewed and are negative.  --------------------------------------------- PAST HISTORY ---------------------------------------------  Past Medical History:  has a past medical history of Diarrhea, Mastitis, and RUQ discomfort.    Past Surgical History:  has a past surgical history that includes LEEP (2007); Upper gastrointestinal endoscopy (N/A, 5/21/2019); esophageal motility study (N/A, 6/17/2019); and hiatal hernia repair (N/A, 8/1/2019).    Social History:  reports that she has been smoking cigarettes. She has never used smokeless tobacco. She reports that she does not currently use alcohol. She reports that she does not currently use drugs after having used the following drugs: Marijuana (Weed), Cocaine, and Opiates .    Family History: family history is not on file.     The patient’s home medications have been reviewed.    Allergies: Patient has no known allergies.    -------------------------------------------------- RESULTS -------------------------------------------------  All laboratory and radiology results have been personally reviewed by myself   LABS:  No results found for this visit on 04/12/24.    RADIOLOGY:  Interpreted by Radiologist.  No orders to display       -------------------------

## 2024-04-13 NOTE — DISCHARGE INSTRUCTIONS
Follow-up with the breast surgeon is soon as possible he should get an ultrasound or MRI or mammogram to make sure there is not a mass under this and return if fevers vomiting or increased swelling of the

## 2024-06-23 ENCOUNTER — HOSPITAL ENCOUNTER (EMERGENCY)
Age: 39
Discharge: HOME | End: 2024-06-23
Payer: MEDICAID

## 2024-06-23 VITALS
OXYGEN SATURATION: 96 % | RESPIRATION RATE: 18 BRPM | TEMPERATURE: 99.4 F | WEIGHT: 227 LBS | HEIGHT: 63 IN | HEART RATE: 77 BPM | BODY MASS INDEX: 40.22 KG/M2 | DIASTOLIC BLOOD PRESSURE: 73 MMHG | SYSTOLIC BLOOD PRESSURE: 113 MMHG

## 2024-06-23 DIAGNOSIS — B07.0 PLANTAR WART OF BOTH FEET: Primary | ICD-10-CM

## 2024-06-23 PROCEDURE — 99282 EMERGENCY DEPT VISIT SF MDM: CPT

## 2024-06-23 ASSESSMENT — PAIN DESCRIPTION - LOCATION: LOCATION: FOOT

## 2024-06-23 ASSESSMENT — PAIN - FUNCTIONAL ASSESSMENT: PAIN_FUNCTIONAL_ASSESSMENT: 0-10

## 2024-06-23 ASSESSMENT — PAIN DESCRIPTION - ORIENTATION: ORIENTATION: RIGHT

## 2024-06-23 ASSESSMENT — PAIN DESCRIPTION - PAIN TYPE: TYPE: ACUTE PAIN

## 2024-06-23 ASSESSMENT — PAIN DESCRIPTION - DESCRIPTORS: DESCRIPTORS: SORE

## 2024-06-23 ASSESSMENT — PAIN SCALES - GENERAL: PAINLEVEL_OUTOF10: 6

## 2024-06-23 NOTE — ED PROVIDER NOTES
Independent ROSEMARIE Visit.     [facility]  Department of Emergency Medicine   ED  Encounter Note  Admit Date/RoomTime: 2024 11:07 AM  ED Room:   NAME: Wilma Silva  : 1985  MRN: [id number]     Chief Complaint:  Foot Pain (Pt with wart on bottom of right foot for 7-8 months)    HISTORY OF PRESENT ILLNESS        Wilma Silva is a 39 y.o. female who presents to the ED with complaint of warts on the bottom of her feet.  Patient states she has had these multiple times in the past.  But she has a big wart to the bottom of her right foot that is bothering her.  Denies any other complaints.  Symptoms are mild in severity.  She tries digging them out herself at home.      ROS   Pertinent positives and negatives are stated within HPI, all other systems reviewed and are negative.    Past Medical History:  has a past medical history of Diarrhea, Mastitis, and RUQ discomfort.    Surgical History:  has a past surgical history that includes LEEP (); Upper gastrointestinal endoscopy (N/A, 2019); esophageal motility study (N/A, 2019); and hiatal hernia repair (N/A, 2019).    Social History:  reports that she has been smoking cigarettes. She has never used smokeless tobacco. She reports that she does not currently use alcohol. She reports that she does not currently use drugs after having used the following drugs: Marijuana (Weed), Cocaine, and Opiates .    Family History: family history is not on file.     Allergies: Patient has no known allergies.    PHYSICAL EXAM   Oxygen Saturation Interpretation: Normal on room air analysis.        ED Triage Vitals   BP Temp Temp Source Pulse Respirations SpO2 Height Weight - Scale   24 1059 24 1059 24 1059 24 1059 24 1059 24 1059 24 1110 24 1109   113/73 99.4 °F (37.4 °C) Oral 77 18 96 % 1.6 m (5' 3\") 102.8 kg (226 lb 9.6 oz)         General:  NAD.  Alert and Oriented.

## 2025-05-20 NOTE — PATIENT INSTRUCTIONS
Endocrine Refill protocol for metformin    Protocol Criteria:  PASSED  Reason: N/A    If all below requirements are met, send a 90-day supply with 1 refill per provider protocol.     Verify appointment with Endocrinology completed in the last 6 months or scheduled in the next 3 months.  Verify A1C has been completed within the last 6 months and is below 8.5%  Verify last GFR is greater than or equal to 40 in the past 12 months    Last completed office visit:5/8/2025 Leigh Taylor MD     Next scheduled Follow up: No future appointments.    Last GFR result:    Lab Results   Component Value Date    EGFRCR  05/08/2025      Comment:      eGFR calculated using the CKD-EPI 2021 calculation     Last A1c result: Last A1C result: 6.7% done 5/8/2025.      Patient Information and Instructions for  Upper GI Endoscopy or Esophagogastroduodenoscopy [EGD])         Definition Upper GI Endoscopy or Esophagogastroduodenoscopy [EGD])  This is a test that uses a fiberoptic scope to examine the esophagus (throat), stomach, and upper part of the small intestines. Upper GI endoscopy may be recommended if you have:   Abdominal pain   Severe heartburn   Persistent nausea and vomiting   Difficulty swallowing   Blood in stool or vomit   Abnormal x-ray or other examinations of the gastrointestinal tract     Conditions that can be diagnosed with upper GI endoscopy include:   Ulcers   Tumors   Polyps   Abnormal narrowing   Inflammation     Possible Complications   Complications are rare, but no procedure is completely free of risk. If you are planning to have upper GI endoscopy, your doctor will review a list of possible complications, which may include:   Bleeding   Damage to the esophagus, stomach, or intestine   Infection   Respiratory depression (reduced breathing rate and/or depth)   Reaction to sedatives or anesthesia causing your blood pressure to drop    Some factors that may increase the risk of complications include:   Age: 61 or older   Pregnancy   Obesity   Smoking , alcoholism , or drug use   Malnutrition   Recent illness   Diabetes   Heart or lung problems   Bleeding disorders   Use of certain medicines     Be sure to discuss these risks with your doctor before the test.     What to Expect   Prior to test   Leading up to the test:   Arrange for a ride home after the test. Also, arrange for help at home. The night before, eat a light meal.  Do not eat or drink anything after midnight the night before the test.   Talk to your doctor about your medicines.  You may be asked to stop taking some medicines up to one week before the procedure, like:   Anti-inflammatory drugs (e.g., aspirin )   Blood thinners, like clopidogrel (Plavix) or warfarin (Coumadin)     Description of the Test   You will be asked to lie on your left side. You will have monitors tracking your breathing, heart rate, and blood oxygen levels. You will be given supplemental oxygen to breathe through your nose. A mouthpiece will be positioned to help keep your mouth open. Your throat may be sprayed with a numbing medicine. You will be given a sedative through an IV to help you relax during the test.  During the test, a small suction tube will be used to clear saliva and fluids from your mouth. The endoscope will be lubricated and placed in your mouth. You will be asked to try to swallow it. Then, it will be carefully and slowly advanced down your throat. It will be passed through your esophagus and into your stomach and intestine. While the endoscope is being advanced, your doctor will view the images on the screen. Air will be passed through the endoscope into your digestive tract. This will be done to smooth the normal folds in the tissues, allowing your doctor to view the tissue more easily. Tiny tools may be passed through the endoscope in order to take biopsies or do other tests. After Test   After the test, you will be observed for an hour. Then, you will be allowed to go home. When you return home after the test, do the following to help ensure a smooth recovery:   Rest when you get home. Ask your doctor if you can resume your normal diet. In most cases, you will be able to. Sedatives can slow your reaction time. Do not drive or use machinery for the rest of the day. Avoid alcohol for the rest of the day. Be sure to follow your doctor's instructions . How Long Will It Take? Usually about 10-15 minutes     Will It Hurt? Most people do not feel anything during the test and will not remember the test.  After the test, your throat may be sore and you may feel bloated. Results   This test gives your doctor information about the health of your digestive system.  The results can help to explain your symptoms. You and your doctor will talk about the results and your treatment plan. Call Your Doctor   After the test, call your doctor if any of the following occurs:   Signs of infection, including fever and chills   Severe abdominal pain   Hard, swollen abdomen   Difficulty swallowing or breathing   Any change or increase in your original symptoms   Bloody or black tarry colored stools   Nausea and/or vomiting   Cough, shortness of breath, or chest pain   Bleeding     In case of emergency, call 911.

## (undated) DEVICE — MEDI-VAC YANKAUER SUCTION HANDLE W/BULBOUS TIP: Brand: CARDINAL HEALTH

## (undated) DEVICE — GLOVE ORANGE PI 7 1/2   MSG9075

## (undated) DEVICE — CANNULA SEAL

## (undated) DEVICE — WARMER SCP LAP

## (undated) DEVICE — GOWN ISOLATN REG YEL M WT MULTIPLY SIDETIE LEV 2

## (undated) DEVICE — ELECTRO LUBE IS A SINGLE PATIENT USE DEVICE THAT IS INTENDED TO BE USED ON ELECTROSURGICAL ELECTRODES TO REDUCE STICKING.: Brand: KEY SURGICAL ELECTRO LUBE

## (undated) DEVICE — PATIENT RETURN ELECTRODE, SINGLE-USE, CONTACT QUALITY MONITORING, ADULT, WITH 9FT CORD, FOR PATIENTS WEIGING OVER 33LBS. (15KG): Brand: MEGADYNE

## (undated) DEVICE — STANDARD HYPODERMIC NEEDLE,POLYPROPYLENE HUB: Brand: MONOJECT

## (undated) DEVICE — KENDALL 450 SERIES MONITORING FOAM ELECTRODE - RECTANGULAR SHAPE ( 3/PK): Brand: KENDALL

## (undated) DEVICE — 6 X 9  1.75MIL 4-WALL LABGUARD: Brand: MINIGRIP COMMERCIAL LLC

## (undated) DEVICE — SPONGE GZ W4XL4IN RAYON POLY FILL CVR W/ NONWOVEN FAB

## (undated) DEVICE — COVER,TABLE,44X90,STERILE: Brand: MEDLINE

## (undated) DEVICE — COVER,LIGHT HANDLE,FLX,1/PK: Brand: MEDLINE INDUSTRIES, INC.

## (undated) DEVICE — CONTAINER SPEC COLL 960ML POLYPR TRIANG GRAD INTAKE/OUTPUT

## (undated) DEVICE — SET ENDO INSTR LAPAROSCOPIC INCISIONAL

## (undated) DEVICE — FORCEPS BX OVL CUP FEN DISPOSABLE CAP L 160CM RAD JAW 4

## (undated) DEVICE — PLEDGET SURG W3.5XL7MM THK1.5MM WHT PTFE RECT FIRM TFE

## (undated) DEVICE — KIT BEDSIDE REVITAL OX 500ML

## (undated) DEVICE — SCOPE DAVINCI XI 30 DEG W/CORD

## (undated) DEVICE — SHEET DRAPE FULL 70X100

## (undated) DEVICE — COLUMN DRAPE

## (undated) DEVICE — [HIGH FLOW INSUFFLATOR,  DO NOT USE IF PACKAGE IS DAMAGED,  KEEP DRY,  KEEP AWAY FROM SUNLIGHT,  PROTECT FROM HEAT AND RADIOACTIVE SOURCES.]: Brand: PNEUMOSURE

## (undated) DEVICE — GASTRIC 45 DEGREE LENS

## (undated) DEVICE — CAMERA STRYKER 1488 HD GEN

## (undated) DEVICE — GOWN,SIRUS,NONRNF,SETINSLV,XL,20/CS: Brand: MEDLINE

## (undated) DEVICE — BLOCK BITE 60FR CAREGUARD

## (undated) DEVICE — ARM DRAPE

## (undated) DEVICE — MEGA SUTURECUT ND: Brand: ENDOWRIST

## (undated) DEVICE — TIP-UP FENESTRATED GRASPER: Brand: ENDOWRIST

## (undated) DEVICE — TROCAR: Brand: KII SLEEVE

## (undated) DEVICE — BLADELESS OBTURATOR: Brand: WECK VISTA

## (undated) DEVICE — Z INACTIVE USE 2660664 SOLUTION IRRIG 3000ML 0.9% SOD CHL USP UROMATIC PLAS CONT

## (undated) DEVICE — LUBRICANT SURG JELLY ST BACTER TUBE 4.25OZ

## (undated) DEVICE — Device: Brand: DEFENDO VALVE AND CONNECTOR KIT

## (undated) DEVICE — SUCTION IRRIGATOR: Brand: ENDOWRIST

## (undated) DEVICE — HARMONIC ACE

## (undated) DEVICE — GRADUATE TRIANG MEASURE 1000ML BLK PRNT

## (undated) DEVICE — CHLORAPREP 26ML ORANGE

## (undated) DEVICE — BLOCK BITE 60FR RUBBER ADLT DENTAL

## (undated) DEVICE — MASK,FACE,MAXFLUIDPROTECT,SHIELD/ERLPS: Brand: MEDLINE

## (undated) DEVICE — CADIERE FORCEPS: Brand: ENDOWRIST

## (undated) DEVICE — MEDI-VAC NON-CONDUCTIVE SUCTION TUBING: Brand: CARDINAL HEALTH

## (undated) DEVICE — Device

## (undated) DEVICE — GARMENT,MEDLINE,DVT,INT,CALF,MED, GEN2: Brand: MEDLINE

## (undated) DEVICE — Device: Brand: INSTRUSAFE

## (undated) DEVICE — SOLUTION IV IRRIG WATER 1000ML POUR BRL 2F7114

## (undated) DEVICE — CONTROL SYRINGE LUER-LOCK TIP: Brand: MONOJECT

## (undated) DEVICE — INSUFFLATION NEEDLE TO ESTABLISH PNEUMOPERITONEUM.: Brand: INSUFFLATION NEEDLE

## (undated) DEVICE — MARKER,SKIN,WI/RULER AND LABELS: Brand: MEDLINE

## (undated) DEVICE — TOWEL,OR,DSP,ST,BLUE,STD,6/PK,12PK/CS: Brand: MEDLINE

## (undated) DEVICE — PACK SURG LAP CHOLE CUSTOM

## (undated) DEVICE — FORCEPS BX L240CM JAW DIA2.8MM L CAP W/ NDL MIC MESH TOOTH

## (undated) DEVICE — SPONGE GZ 4IN 4IN 4 PLY N WVN AVANT